# Patient Record
Sex: FEMALE | Race: WHITE | Employment: OTHER | URBAN - METROPOLITAN AREA
[De-identification: names, ages, dates, MRNs, and addresses within clinical notes are randomized per-mention and may not be internally consistent; named-entity substitution may affect disease eponyms.]

---

## 2017-04-20 ENCOUNTER — HOSPITAL ENCOUNTER (OUTPATIENT)
Dept: GENERAL RADIOLOGY | Age: 82
Discharge: HOME | End: 2017-04-20
Attending: FAMILY MEDICINE
Payer: MEDICARE

## 2017-04-20 DIAGNOSIS — R05.9 COUGH: ICD-10-CM

## 2017-04-20 PROCEDURE — 71020 XR CHEST PA LAT: CPT

## 2018-06-25 ENCOUNTER — HOSPITAL ENCOUNTER (OUTPATIENT)
Dept: GENERAL RADIOLOGY | Age: 83
Discharge: HOME | End: 2018-06-25
Attending: FAMILY MEDICINE
Payer: MEDICARE

## 2018-06-25 DIAGNOSIS — M79.602 LEFT ARM PAIN: ICD-10-CM

## 2018-06-25 PROCEDURE — 73060 X-RAY EXAM OF HUMERUS: CPT

## 2020-02-11 ENCOUNTER — HOSPITAL ENCOUNTER (OUTPATIENT)
Dept: GENERAL RADIOLOGY | Age: 85
Discharge: HOME | End: 2020-02-11
Attending: INTERNAL MEDICINE
Payer: MEDICARE

## 2020-02-11 DIAGNOSIS — R05.9 COUGH: ICD-10-CM

## 2020-02-11 PROCEDURE — 71046 X-RAY EXAM CHEST 2 VIEWS: CPT

## 2021-01-01 ENCOUNTER — APPOINTMENT (OUTPATIENT)
Dept: GENERAL RADIOLOGY | Age: 86
DRG: 311 | End: 2021-01-01
Attending: EMERGENCY MEDICINE
Payer: MEDICARE

## 2021-01-01 ENCOUNTER — HOSPITAL ENCOUNTER (INPATIENT)
Age: 86
LOS: 2 days | DRG: 951 | End: 2021-10-24
Attending: FAMILY MEDICINE | Admitting: FAMILY MEDICINE
Payer: OTHER MISCELLANEOUS

## 2021-01-01 ENCOUNTER — HOME CARE VISIT (OUTPATIENT)
Dept: HOSPICE | Facility: HOSPICE | Age: 86
End: 2021-01-01
Payer: MEDICARE

## 2021-01-01 ENCOUNTER — HOSPICE ADMISSION (OUTPATIENT)
Dept: HOSPICE | Facility: HOSPICE | Age: 86
End: 2021-01-01
Payer: MEDICARE

## 2021-01-01 ENCOUNTER — HOSPITAL ENCOUNTER (INPATIENT)
Age: 86
LOS: 2 days | Discharge: HOSPICE | DRG: 311 | End: 2021-10-22
Attending: STUDENT IN AN ORGANIZED HEALTH CARE EDUCATION/TRAINING PROGRAM | Admitting: INTERNAL MEDICINE
Payer: MEDICARE

## 2021-01-01 ENCOUNTER — APPOINTMENT (OUTPATIENT)
Dept: NON INVASIVE DIAGNOSTICS | Age: 86
DRG: 311 | End: 2021-01-01
Attending: INTERNAL MEDICINE
Payer: MEDICARE

## 2021-01-01 ENCOUNTER — TELEPHONE (OUTPATIENT)
Dept: CARDIOLOGY CLINIC | Age: 86
End: 2021-01-01

## 2021-01-01 VITALS
TEMPERATURE: 97.5 F | WEIGHT: 190.26 LBS | BODY MASS INDEX: 37.35 KG/M2 | DIASTOLIC BLOOD PRESSURE: 59 MMHG | HEIGHT: 60 IN | RESPIRATION RATE: 20 BRPM | SYSTOLIC BLOOD PRESSURE: 172 MMHG | HEART RATE: 72 BPM | OXYGEN SATURATION: 95 %

## 2021-01-01 VITALS
BODY MASS INDEX: 37.35 KG/M2 | RESPIRATION RATE: 22 BRPM | OXYGEN SATURATION: 90 % | HEART RATE: 100 BPM | WEIGHT: 190.26 LBS | TEMPERATURE: 98.2 F | SYSTOLIC BLOOD PRESSURE: 147 MMHG | HEIGHT: 60 IN | DIASTOLIC BLOOD PRESSURE: 69 MMHG

## 2021-01-01 DIAGNOSIS — Z71.1 CONCERN ABOUT END OF LIFE: ICD-10-CM

## 2021-01-01 DIAGNOSIS — I50.9 ACUTE ON CHRONIC CONGESTIVE HEART FAILURE, UNSPECIFIED HEART FAILURE TYPE (HCC): ICD-10-CM

## 2021-01-01 DIAGNOSIS — F41.9 ANXIETY: ICD-10-CM

## 2021-01-01 DIAGNOSIS — R06.4 LABORED BREATHING: ICD-10-CM

## 2021-01-01 DIAGNOSIS — J96.00 ACUTE RESPIRATORY FAILURE, UNSPECIFIED WHETHER WITH HYPOXIA OR HYPERCAPNIA (HCC): ICD-10-CM

## 2021-01-01 DIAGNOSIS — Z51.5 HOSPICE CARE: ICD-10-CM

## 2021-01-01 DIAGNOSIS — K11.7 INCREASED OROPHARYNGEAL SECRETIONS: ICD-10-CM

## 2021-01-01 DIAGNOSIS — R77.8 ELEVATED TROPONIN: ICD-10-CM

## 2021-01-01 DIAGNOSIS — I50.84 END STAGE HEART FAILURE (HCC): ICD-10-CM

## 2021-01-01 DIAGNOSIS — R09.02 HYPOXIA: Primary | ICD-10-CM

## 2021-01-01 DIAGNOSIS — I50.41 ACUTE COMBINED SYSTOLIC AND DIASTOLIC ACC/AHA STAGE C CONGESTIVE HEART FAILURE (HCC): ICD-10-CM

## 2021-01-01 DIAGNOSIS — Z71.89 ADVANCED CARE PLANNING/COUNSELING DISCUSSION: ICD-10-CM

## 2021-01-01 DIAGNOSIS — R45.1 RESTLESSNESS: ICD-10-CM

## 2021-01-01 DIAGNOSIS — Z71.89 GOALS OF CARE, COUNSELING/DISCUSSION: ICD-10-CM

## 2021-01-01 DIAGNOSIS — R06.02 SHORTNESS OF BREATH: ICD-10-CM

## 2021-01-01 DIAGNOSIS — Z51.5 PALLIATIVE CARE ENCOUNTER: ICD-10-CM

## 2021-01-01 DIAGNOSIS — R06.82 TACHYPNEA: ICD-10-CM

## 2021-01-01 LAB
ALBUMIN SERPL-MCNC: 2 G/DL (ref 3.5–5)
ALBUMIN/GLOB SERPL: 0.4 {RATIO} (ref 1.1–2.2)
ALP SERPL-CCNC: 67 U/L (ref 45–117)
ALT SERPL-CCNC: 33 U/L (ref 12–78)
ANION GAP SERPL CALC-SCNC: 10 MMOL/L (ref 5–15)
ANION GAP SERPL CALC-SCNC: 6 MMOL/L (ref 5–15)
APTT PPP: 121 SEC (ref 22.1–31)
APTT PPP: 24.4 SEC (ref 22.1–31)
APTT PPP: 49.2 SEC (ref 22.1–31)
APTT PPP: 50.1 SEC (ref 22.1–31)
APTT PPP: 64.7 SEC (ref 22.1–31)
APTT PPP: >130 SEC (ref 22.1–31)
AST SERPL-CCNC: 17 U/L (ref 15–37)
ATRIAL RATE: 99 BPM
BASOPHILS # BLD: 0 K/UL (ref 0–0.1)
BASOPHILS # BLD: 0 K/UL (ref 0–0.1)
BASOPHILS # BLD: 0.1 K/UL (ref 0–0.1)
BASOPHILS NFR BLD: 0 % (ref 0–1)
BASOPHILS NFR BLD: 0 % (ref 0–1)
BASOPHILS NFR BLD: 1 % (ref 0–1)
BILIRUB SERPL-MCNC: 0.4 MG/DL (ref 0.2–1)
BNP SERPL-MCNC: ABNORMAL PG/ML
BUN SERPL-MCNC: 32 MG/DL (ref 6–20)
BUN SERPL-MCNC: 32 MG/DL (ref 6–20)
BUN/CREAT SERPL: 13 (ref 12–20)
BUN/CREAT SERPL: 14 (ref 12–20)
CALCIUM SERPL-MCNC: 8.2 MG/DL (ref 8.5–10.1)
CALCIUM SERPL-MCNC: 8.2 MG/DL (ref 8.5–10.1)
CALCULATED P AXIS, ECG09: 38 DEGREES
CALCULATED R AXIS, ECG10: -16 DEGREES
CALCULATED T AXIS, ECG11: 159 DEGREES
CHLORIDE SERPL-SCNC: 106 MMOL/L (ref 97–108)
CHLORIDE SERPL-SCNC: 107 MMOL/L (ref 97–108)
CO2 SERPL-SCNC: 28 MMOL/L (ref 21–32)
CO2 SERPL-SCNC: 31 MMOL/L (ref 21–32)
COMMENT, HOLDF: NORMAL
COVID-19 RAPID TEST, COVR: NOT DETECTED
CREAT SERPL-MCNC: 2.37 MG/DL (ref 0.55–1.02)
CREAT SERPL-MCNC: 2.51 MG/DL (ref 0.55–1.02)
DIAGNOSIS, 93000: NORMAL
DIFFERENTIAL METHOD BLD: ABNORMAL
ECHO AO ASC DIAM: 2.89 CM
ECHO AO ROOT DIAM: 3.58 CM
ECHO AV AREA PEAK VELOCITY: 0.46 CM2
ECHO AV AREA VTI: 0.53 CM2
ECHO AV AREA/BSA PEAK VELOCITY: 0.3 CM2/M2
ECHO AV AREA/BSA VTI: 0.3 CM2/M2
ECHO AV MEAN GRADIENT: 32.1 MMHG
ECHO AV PEAK GRADIENT: 52.56 MMHG
ECHO AV PEAK VELOCITY: 362.48 CM/S
ECHO AV VTI: 56.64 CM
ECHO IVC PROX: 1.93 CM
ECHO LA AREA 4C: 16.24 CM2
ECHO LA MAJOR AXIS: 3.9 CM
ECHO LA MINOR AXIS: 2.13 CM
ECHO LA VOL 2C: 33.03 ML (ref 22–52)
ECHO LA VOL 4C: 39.51 ML (ref 22–52)
ECHO LA VOL BP: 41.48 ML (ref 22–52)
ECHO LA VOL/BSA BIPLANE: 22.67 ML/M2 (ref 16–28)
ECHO LA VOLUME INDEX A2C: 18.05 ML/M2 (ref 16–28)
ECHO LA VOLUME INDEX A4C: 21.59 ML/M2 (ref 16–28)
ECHO LV E' LATERAL VELOCITY: 6.03 CENTIMETER/SECOND
ECHO LV E' SEPTAL VELOCITY: 5.54 CENTIMETER/SECOND
ECHO LV INTERNAL DIMENSION DIASTOLIC: 3.43 CM (ref 3.9–5.3)
ECHO LV INTERNAL DIMENSION SYSTOLIC: 3.27 CM
ECHO LV IVSD: 1.04 CM (ref 0.6–0.9)
ECHO LV MASS 2D: 102.5 G (ref 67–162)
ECHO LV MASS INDEX 2D: 56 G/M2 (ref 43–95)
ECHO LV POSTERIOR WALL DIASTOLIC: 0.99 CM (ref 0.6–0.9)
ECHO LVOT DIAM: 1.86 CM
ECHO LVOT PEAK GRADIENT: 1.76 MMHG
ECHO LVOT PEAK VELOCITY: 61.18 CM/S
ECHO LVOT SV: 29.8 ML
ECHO LVOT VTI: 10.98 CM
ECHO MV A VELOCITY: 0.1 CENTIMETER/SECOND
ECHO MV AREA PHT: 7.72 CM2
ECHO MV E DECELERATION TIME (DT): 98.24 MS
ECHO MV E VELOCITY: 118.79 CENTIMETER/SECOND
ECHO MV PRESSURE HALF TIME (PHT): 28.49 MS
ECHO RV INTERNAL DIMENSION: 3.81 CM
ECHO RV TAPSE: 1.1 CM (ref 1.5–2)
ECHO TV REGURGITANT MAX VELOCITY: 296.39 CM/S
ECHO TV REGURGITANT PEAK GRADIENT: 35.14 MMHG
EOSINOPHIL # BLD: 0 K/UL (ref 0–0.4)
EOSINOPHIL # BLD: 0.2 K/UL (ref 0–0.4)
EOSINOPHIL # BLD: 0.4 K/UL (ref 0–0.4)
EOSINOPHIL NFR BLD: 0 % (ref 0–7)
EOSINOPHIL NFR BLD: 2 % (ref 0–7)
EOSINOPHIL NFR BLD: 3 % (ref 0–7)
ERYTHROCYTE [DISTWIDTH] IN BLOOD BY AUTOMATED COUNT: 17.2 % (ref 11.5–14.5)
GLOBULIN SER CALC-MCNC: 4.9 G/DL (ref 2–4)
GLUCOSE BLD STRIP.AUTO-MCNC: 116 MG/DL (ref 65–117)
GLUCOSE BLD STRIP.AUTO-MCNC: 125 MG/DL (ref 65–117)
GLUCOSE BLD STRIP.AUTO-MCNC: 155 MG/DL (ref 65–117)
GLUCOSE BLD STRIP.AUTO-MCNC: 164 MG/DL (ref 65–117)
GLUCOSE BLD STRIP.AUTO-MCNC: 183 MG/DL (ref 65–117)
GLUCOSE BLD STRIP.AUTO-MCNC: 253 MG/DL (ref 65–117)
GLUCOSE SERPL-MCNC: 117 MG/DL (ref 65–100)
GLUCOSE SERPL-MCNC: 159 MG/DL (ref 65–100)
HCT VFR BLD AUTO: 29.9 % (ref 35–47)
HCT VFR BLD AUTO: 31.6 % (ref 35–47)
HCT VFR BLD AUTO: 32.5 % (ref 35–47)
HGB BLD-MCNC: 10 G/DL (ref 11.5–16)
HGB BLD-MCNC: 9.1 G/DL (ref 11.5–16)
HGB BLD-MCNC: 9.9 G/DL (ref 11.5–16)
IMM GRANULOCYTES # BLD AUTO: 0 K/UL
IMM GRANULOCYTES NFR BLD AUTO: 0 %
LA VOL DISK BP: 37.77 ML (ref 22–52)
LVOT MG: 0.79 MMHG
LYMPHOCYTES # BLD: 1.5 K/UL (ref 0.8–3.5)
LYMPHOCYTES # BLD: 1.9 K/UL (ref 0.8–3.5)
LYMPHOCYTES # BLD: 2.3 K/UL (ref 0.8–3.5)
LYMPHOCYTES NFR BLD: 13 % (ref 12–49)
LYMPHOCYTES NFR BLD: 18 % (ref 12–49)
LYMPHOCYTES NFR BLD: 20 % (ref 12–49)
MAGNESIUM SERPL-MCNC: 2.1 MG/DL (ref 1.6–2.4)
MCH RBC QN AUTO: 28.4 PG (ref 26–34)
MCH RBC QN AUTO: 28.7 PG (ref 26–34)
MCH RBC QN AUTO: 29.4 PG (ref 26–34)
MCHC RBC AUTO-ENTMCNC: 30.4 G/DL (ref 30–36.5)
MCHC RBC AUTO-ENTMCNC: 30.8 G/DL (ref 30–36.5)
MCHC RBC AUTO-ENTMCNC: 31.3 G/DL (ref 30–36.5)
MCV RBC AUTO: 93.4 FL (ref 80–99)
MCV RBC AUTO: 93.4 FL (ref 80–99)
MCV RBC AUTO: 93.8 FL (ref 80–99)
METAMYELOCYTES NFR BLD MANUAL: 1 %
METAMYELOCYTES NFR BLD MANUAL: 1 %
MONOCYTES # BLD: 0.3 K/UL (ref 0–1)
MONOCYTES # BLD: 0.4 K/UL (ref 0–1)
MONOCYTES # BLD: 1.1 K/UL (ref 0–1)
MONOCYTES NFR BLD: 2 % (ref 5–13)
MONOCYTES NFR BLD: 4 % (ref 5–13)
MONOCYTES NFR BLD: 9 % (ref 5–13)
MYELOCYTES NFR BLD MANUAL: 1 %
MYELOCYTES NFR BLD MANUAL: 2 %
NEUTS BAND NFR BLD MANUAL: 4 % (ref 0–6)
NEUTS SEG # BLD: 6.8 K/UL (ref 1.8–8)
NEUTS SEG # BLD: 9.3 K/UL (ref 1.8–8)
NEUTS SEG # BLD: 9.5 K/UL (ref 1.8–8)
NEUTS SEG NFR BLD: 70 % (ref 32–75)
NEUTS SEG NFR BLD: 71 % (ref 32–75)
NEUTS SEG NFR BLD: 78 % (ref 32–75)
NRBC # BLD: 0 K/UL (ref 0–0.01)
NRBC BLD-RTO: 0 PER 100 WBC
P-R INTERVAL, ECG05: 156 MS
PLATELET # BLD AUTO: 359 K/UL (ref 150–400)
PLATELET # BLD AUTO: 361 K/UL (ref 150–400)
PLATELET # BLD AUTO: 375 K/UL (ref 150–400)
PMV BLD AUTO: 9.7 FL (ref 8.9–12.9)
PMV BLD AUTO: 9.9 FL (ref 8.9–12.9)
PMV BLD AUTO: 9.9 FL (ref 8.9–12.9)
POTASSIUM SERPL-SCNC: 3.2 MMOL/L (ref 3.5–5.1)
POTASSIUM SERPL-SCNC: 3.5 MMOL/L (ref 3.5–5.1)
PROT SERPL-MCNC: 6.9 G/DL (ref 6.4–8.2)
Q-T INTERVAL, ECG07: 342 MS
QRS DURATION, ECG06: 88 MS
QTC CALCULATION (BEZET), ECG08: 438 MS
RBC # BLD AUTO: 3.2 M/UL (ref 3.8–5.2)
RBC # BLD AUTO: 3.37 M/UL (ref 3.8–5.2)
RBC # BLD AUTO: 3.48 M/UL (ref 3.8–5.2)
RBC MORPH BLD: ABNORMAL
SAMPLES BEING HELD,HOLD: NORMAL
SERVICE CMNT-IMP: ABNORMAL
SERVICE CMNT-IMP: NORMAL
SODIUM SERPL-SCNC: 143 MMOL/L (ref 136–145)
SODIUM SERPL-SCNC: 145 MMOL/L (ref 136–145)
SOURCE, COVRS: NORMAL
THERAPEUTIC RANGE,PTTT: ABNORMAL SECS (ref 58–77)
THERAPEUTIC RANGE,PTTT: NORMAL SECS (ref 58–77)
TROPONIN-HIGH SENSITIVITY: 806 NG/L (ref 0–51)
TROPONIN-HIGH SENSITIVITY: 851 NG/L (ref 0–51)
TROPONIN-HIGH SENSITIVITY: 916 NG/L (ref 0–51)
VENTRICULAR RATE, ECG03: 99 BPM
WBC # BLD AUTO: 11.9 K/UL (ref 3.6–11)
WBC # BLD AUTO: 12.6 K/UL (ref 3.6–11)
WBC # BLD AUTO: 9.7 K/UL (ref 3.6–11)
WBC MORPH BLD: ABNORMAL

## 2021-01-01 PROCEDURE — 77010033678 HC OXYGEN DAILY

## 2021-01-01 PROCEDURE — 0656 HSPC GENERAL INPATIENT

## 2021-01-01 PROCEDURE — 3336500001 HSPC ELECTION

## 2021-01-01 PROCEDURE — 74011000258 HC RX REV CODE- 258: Performed by: INTERNAL MEDICINE

## 2021-01-01 PROCEDURE — 80053 COMPREHEN METABOLIC PANEL: CPT

## 2021-01-01 PROCEDURE — 74011250636 HC RX REV CODE- 250/636: Performed by: INTERNAL MEDICINE

## 2021-01-01 PROCEDURE — 74011250636 HC RX REV CODE- 250/636: Performed by: HOSPITALIST

## 2021-01-01 PROCEDURE — 65270000029 HC RM PRIVATE

## 2021-01-01 PROCEDURE — 74011250636 HC RX REV CODE- 250/636: Performed by: FAMILY MEDICINE

## 2021-01-01 PROCEDURE — 84484 ASSAY OF TROPONIN QUANT: CPT

## 2021-01-01 PROCEDURE — 94640 AIRWAY INHALATION TREATMENT: CPT

## 2021-01-01 PROCEDURE — 99221 1ST HOSP IP/OBS SF/LOW 40: CPT | Performed by: NURSE PRACTITIONER

## 2021-01-01 PROCEDURE — 2709999900 HC NON-CHARGEABLE SUPPLY

## 2021-01-01 PROCEDURE — 36415 COLL VENOUS BLD VENIPUNCTURE: CPT

## 2021-01-01 PROCEDURE — 94664 DEMO&/EVAL PT USE INHALER: CPT

## 2021-01-01 PROCEDURE — 65660000000 HC RM CCU STEPDOWN

## 2021-01-01 PROCEDURE — 99233 SBSQ HOSP IP/OBS HIGH 50: CPT | Performed by: INTERNAL MEDICINE

## 2021-01-01 PROCEDURE — 82962 GLUCOSE BLOOD TEST: CPT

## 2021-01-01 PROCEDURE — 85730 THROMBOPLASTIN TIME PARTIAL: CPT

## 2021-01-01 PROCEDURE — 80048 BASIC METABOLIC PNL TOTAL CA: CPT

## 2021-01-01 PROCEDURE — 93005 ELECTROCARDIOGRAM TRACING: CPT

## 2021-01-01 PROCEDURE — 74011250637 HC RX REV CODE- 250/637: Performed by: SPECIALIST

## 2021-01-01 PROCEDURE — 83880 ASSAY OF NATRIURETIC PEPTIDE: CPT

## 2021-01-01 PROCEDURE — 74011000250 HC RX REV CODE- 250: Performed by: INTERNAL MEDICINE

## 2021-01-01 PROCEDURE — 71045 X-RAY EXAM CHEST 1 VIEW: CPT

## 2021-01-01 PROCEDURE — C8929 TTE W OR WO FOL WCON,DOPPLER: HCPCS

## 2021-01-01 PROCEDURE — 99231 SBSQ HOSP IP/OBS SF/LOW 25: CPT | Performed by: SPECIALIST

## 2021-01-01 PROCEDURE — 93306 TTE W/DOPPLER COMPLETE: CPT | Performed by: INTERNAL MEDICINE

## 2021-01-01 PROCEDURE — 99285 EMERGENCY DEPT VISIT HI MDM: CPT

## 2021-01-01 PROCEDURE — 92610 EVALUATE SWALLOWING FUNCTION: CPT

## 2021-01-01 PROCEDURE — 74011250637 HC RX REV CODE- 250/637: Performed by: INTERNAL MEDICINE

## 2021-01-01 PROCEDURE — 74011636637 HC RX REV CODE- 636/637: Performed by: INTERNAL MEDICINE

## 2021-01-01 PROCEDURE — 74011250637 HC RX REV CODE- 250/637: Performed by: HOSPITALIST

## 2021-01-01 PROCEDURE — 74011000250 HC RX REV CODE- 250: Performed by: FAMILY MEDICINE

## 2021-01-01 PROCEDURE — 83735 ASSAY OF MAGNESIUM: CPT

## 2021-01-01 PROCEDURE — 74011000250 HC RX REV CODE- 250: Performed by: HOSPITALIST

## 2021-01-01 PROCEDURE — 87635 SARS-COV-2 COVID-19 AMP PRB: CPT

## 2021-01-01 PROCEDURE — 85025 COMPLETE CBC W/AUTO DIFF WBC: CPT

## 2021-01-01 PROCEDURE — 99223 1ST HOSP IP/OBS HIGH 75: CPT | Performed by: INTERNAL MEDICINE

## 2021-01-01 PROCEDURE — 77030013140 HC MSK NEB VYRM -A

## 2021-01-01 RX ORDER — FACIAL-BODY WIPES
10 EACH TOPICAL DAILY PRN
Status: DISCONTINUED | OUTPATIENT
Start: 2021-01-01 | End: 2021-01-01 | Stop reason: HOSPADM

## 2021-01-01 RX ORDER — ASPIRIN 81 MG/1
81 TABLET ORAL DAILY
COMMUNITY
End: 2021-01-01

## 2021-01-01 RX ORDER — FLUTICASONE PROPIONATE 220 UG/1
2 AEROSOL, METERED RESPIRATORY (INHALATION)
COMMUNITY
End: 2021-01-01

## 2021-01-01 RX ORDER — PANTOPRAZOLE SODIUM 40 MG/1
40 TABLET, DELAYED RELEASE ORAL DAILY
COMMUNITY
End: 2021-01-01

## 2021-01-01 RX ORDER — MAGNESIUM SULFATE 100 %
4 CRYSTALS MISCELLANEOUS AS NEEDED
Status: DISCONTINUED | OUTPATIENT
Start: 2021-01-01 | End: 2021-01-01 | Stop reason: HOSPADM

## 2021-01-01 RX ORDER — BUDESONIDE 0.5 MG/2ML
500 INHALANT ORAL
Status: DISCONTINUED | OUTPATIENT
Start: 2021-01-01 | End: 2021-01-01 | Stop reason: HOSPADM

## 2021-01-01 RX ORDER — GUAIFENESIN 100 MG/5ML
81 LIQUID (ML) ORAL DAILY
Status: DISCONTINUED | OUTPATIENT
Start: 2021-01-01 | End: 2021-01-01 | Stop reason: HOSPADM

## 2021-01-01 RX ORDER — HEPARIN SODIUM 10000 [USP'U]/100ML
12-25 INJECTION, SOLUTION INTRAVENOUS
Status: DISCONTINUED | OUTPATIENT
Start: 2021-01-01 | End: 2021-01-01

## 2021-01-01 RX ORDER — ATORVASTATIN CALCIUM 20 MG/1
20 TABLET, FILM COATED ORAL
Status: DISCONTINUED | OUTPATIENT
Start: 2021-01-01 | End: 2021-01-01 | Stop reason: HOSPADM

## 2021-01-01 RX ORDER — HEPARIN SODIUM 1000 [USP'U]/ML
4000 INJECTION, SOLUTION INTRAVENOUS; SUBCUTANEOUS ONCE
Status: COMPLETED | OUTPATIENT
Start: 2021-01-01 | End: 2021-01-01

## 2021-01-01 RX ORDER — BUMETANIDE 0.25 MG/ML
1 INJECTION INTRAMUSCULAR; INTRAVENOUS 2 TIMES DAILY
Status: DISCONTINUED | OUTPATIENT
Start: 2021-01-01 | End: 2021-01-01 | Stop reason: HOSPADM

## 2021-01-01 RX ORDER — BUDESONIDE 0.5 MG/2ML
500 INHALANT ORAL
COMMUNITY
End: 2021-01-01

## 2021-01-01 RX ORDER — GLYCOPYRROLATE 0.2 MG/ML
0.2 INJECTION INTRAMUSCULAR; INTRAVENOUS
Status: DISCONTINUED | OUTPATIENT
Start: 2021-01-01 | End: 2021-01-01 | Stop reason: HOSPADM

## 2021-01-01 RX ORDER — CALCIUM CARB/MAGNESIUM CARB 311-232MG
5 TABLET ORAL
Status: DISCONTINUED | OUTPATIENT
Start: 2021-01-01 | End: 2021-01-01 | Stop reason: HOSPADM

## 2021-01-01 RX ORDER — ACETAMINOPHEN 325 MG/1
650 TABLET ORAL
Status: DISCONTINUED | OUTPATIENT
Start: 2021-01-01 | End: 2021-01-01 | Stop reason: HOSPADM

## 2021-01-01 RX ORDER — POLYETHYLENE GLYCOL 3350 17 G/17G
17 POWDER, FOR SOLUTION ORAL DAILY PRN
Status: DISCONTINUED | OUTPATIENT
Start: 2021-01-01 | End: 2021-01-01 | Stop reason: HOSPADM

## 2021-01-01 RX ORDER — SODIUM CHLORIDE 0.9 % (FLUSH) 0.9 %
5 SYRINGE (ML) INJECTION AS NEEDED
Status: DISCONTINUED | OUTPATIENT
Start: 2021-01-01 | End: 2021-01-01 | Stop reason: HOSPADM

## 2021-01-01 RX ORDER — ONDANSETRON 2 MG/ML
4 INJECTION INTRAMUSCULAR; INTRAVENOUS
Status: DISCONTINUED | OUTPATIENT
Start: 2021-01-01 | End: 2021-01-01 | Stop reason: HOSPADM

## 2021-01-01 RX ORDER — AMLODIPINE BESYLATE 5 MG/1
5 TABLET ORAL DAILY
Status: DISCONTINUED | OUTPATIENT
Start: 2021-01-01 | End: 2021-01-01 | Stop reason: HOSPADM

## 2021-01-01 RX ORDER — INSULIN LISPRO 100 [IU]/ML
INJECTION, SOLUTION INTRAVENOUS; SUBCUTANEOUS EVERY 6 HOURS
Status: DISCONTINUED | OUTPATIENT
Start: 2021-01-01 | End: 2021-01-01 | Stop reason: HOSPADM

## 2021-01-01 RX ORDER — FUROSEMIDE 40 MG/1
40 TABLET ORAL DAILY
COMMUNITY

## 2021-01-01 RX ORDER — HALOPERIDOL 5 MG/ML
2 INJECTION INTRAMUSCULAR
Status: DISCONTINUED | OUTPATIENT
Start: 2021-01-01 | End: 2021-01-01

## 2021-01-01 RX ORDER — QUETIAPINE FUMARATE 25 MG/1
25 TABLET, FILM COATED ORAL ONCE
Status: ACTIVE | OUTPATIENT
Start: 2021-01-01 | End: 2021-01-01

## 2021-01-01 RX ORDER — METOPROLOL TARTRATE 5 MG/5ML
5 INJECTION INTRAVENOUS ONCE
Status: COMPLETED | OUTPATIENT
Start: 2021-01-01 | End: 2021-01-01

## 2021-01-01 RX ORDER — DIPHENHYDRAMINE CITRATE AND IBUPROFEN 38; 200 MG/1; MG/1
1 TABLET, COATED ORAL
COMMUNITY
End: 2021-01-01

## 2021-01-01 RX ORDER — LORAZEPAM 2 MG/ML
1 INJECTION INTRAMUSCULAR
Status: DISCONTINUED | OUTPATIENT
Start: 2021-01-01 | End: 2021-01-01 | Stop reason: HOSPADM

## 2021-01-01 RX ORDER — KETOROLAC TROMETHAMINE 30 MG/ML
15 INJECTION, SOLUTION INTRAMUSCULAR; INTRAVENOUS
Status: DISCONTINUED | OUTPATIENT
Start: 2021-01-01 | End: 2021-01-01 | Stop reason: HOSPADM

## 2021-01-01 RX ORDER — HYDROMORPHONE HYDROCHLORIDE 1 MG/ML
1 INJECTION, SOLUTION INTRAMUSCULAR; INTRAVENOUS; SUBCUTANEOUS
Status: DISCONTINUED | OUTPATIENT
Start: 2021-01-01 | End: 2021-01-01 | Stop reason: HOSPADM

## 2021-01-01 RX ORDER — LATANOPROST 50 UG/ML
1 SOLUTION/ DROPS OPHTHALMIC
COMMUNITY
End: 2021-01-01

## 2021-01-01 RX ORDER — CARVEDILOL 6.25 MG/1
6.25 TABLET ORAL 2 TIMES DAILY WITH MEALS
COMMUNITY
End: 2021-01-01

## 2021-01-01 RX ORDER — SODIUM CHLORIDE 0.9 % (FLUSH) 0.9 %
5-40 SYRINGE (ML) INJECTION AS NEEDED
Status: DISCONTINUED | OUTPATIENT
Start: 2021-01-01 | End: 2021-01-01 | Stop reason: HOSPADM

## 2021-01-01 RX ORDER — HYDROMORPHONE HYDROCHLORIDE 1 MG/ML
0.5 INJECTION, SOLUTION INTRAMUSCULAR; INTRAVENOUS; SUBCUTANEOUS EVERY 4 HOURS
Status: DISCONTINUED | OUTPATIENT
Start: 2021-01-01 | End: 2021-01-01

## 2021-01-01 RX ORDER — AMIODARONE HYDROCHLORIDE 200 MG/1
200 TABLET ORAL 2 TIMES DAILY
Status: DISCONTINUED | OUTPATIENT
Start: 2021-01-01 | End: 2021-01-01 | Stop reason: HOSPADM

## 2021-01-01 RX ORDER — GUAIFENESIN/DEXTROMETHORPHAN 100-10MG/5
10 SYRUP ORAL
Status: DISCONTINUED | OUTPATIENT
Start: 2021-01-01 | End: 2021-01-01 | Stop reason: HOSPADM

## 2021-01-01 RX ORDER — HYDRALAZINE HYDROCHLORIDE 20 MG/ML
10 INJECTION INTRAMUSCULAR; INTRAVENOUS
Status: DISCONTINUED | OUTPATIENT
Start: 2021-01-01 | End: 2021-01-01 | Stop reason: HOSPADM

## 2021-01-01 RX ORDER — HALOPERIDOL 5 MG/ML
5 INJECTION INTRAMUSCULAR ONCE
Status: COMPLETED | OUTPATIENT
Start: 2021-01-01 | End: 2021-01-01

## 2021-01-01 RX ORDER — HEPARIN SODIUM 5000 [USP'U]/ML
5000 INJECTION, SOLUTION INTRAVENOUS; SUBCUTANEOUS EVERY 8 HOURS
Status: DISCONTINUED | OUTPATIENT
Start: 2021-01-01 | End: 2021-01-01 | Stop reason: SDUPTHER

## 2021-01-01 RX ORDER — LEVOTHYROXINE SODIUM 50 UG/1
75 TABLET ORAL
Status: DISCONTINUED | OUTPATIENT
Start: 2021-01-01 | End: 2021-01-01 | Stop reason: HOSPADM

## 2021-01-01 RX ORDER — ALBUTEROL SULFATE 0.83 MG/ML
2.5 SOLUTION RESPIRATORY (INHALATION)
COMMUNITY

## 2021-01-01 RX ORDER — HALOPERIDOL 5 MG/ML
2 INJECTION INTRAMUSCULAR
Status: COMPLETED | OUTPATIENT
Start: 2021-01-01 | End: 2021-01-01

## 2021-01-01 RX ORDER — ACETAMINOPHEN 650 MG/1
650 SUPPOSITORY RECTAL
Status: DISCONTINUED | OUTPATIENT
Start: 2021-01-01 | End: 2021-01-01 | Stop reason: HOSPADM

## 2021-01-01 RX ORDER — SODIUM CHLORIDE 0.9 % (FLUSH) 0.9 %
5-40 SYRINGE (ML) INJECTION EVERY 8 HOURS
Status: DISCONTINUED | OUTPATIENT
Start: 2021-01-01 | End: 2021-01-01 | Stop reason: HOSPADM

## 2021-01-01 RX ORDER — HYDROMORPHONE HYDROCHLORIDE 1 MG/ML
0.5 INJECTION, SOLUTION INTRAMUSCULAR; INTRAVENOUS; SUBCUTANEOUS
Status: DISCONTINUED | OUTPATIENT
Start: 2021-01-01 | End: 2021-01-01

## 2021-01-01 RX ORDER — HALOPERIDOL 5 MG/ML
2 INJECTION INTRAMUSCULAR EVERY 4 HOURS
Status: DISCONTINUED | OUTPATIENT
Start: 2021-01-01 | End: 2021-01-01

## 2021-01-01 RX ORDER — LEVOTHYROXINE SODIUM 75 UG/1
75 TABLET ORAL
COMMUNITY
End: 2021-01-01

## 2021-01-01 RX ORDER — HEPARIN SODIUM 1000 [USP'U]/ML
2000 INJECTION, SOLUTION INTRAVENOUS; SUBCUTANEOUS ONCE
Status: COMPLETED | OUTPATIENT
Start: 2021-01-01 | End: 2021-01-01

## 2021-01-01 RX ORDER — ONDANSETRON 4 MG/1
4 TABLET, ORALLY DISINTEGRATING ORAL
Status: DISCONTINUED | OUTPATIENT
Start: 2021-01-01 | End: 2021-01-01 | Stop reason: HOSPADM

## 2021-01-01 RX ORDER — GLIMEPIRIDE 2 MG/1
4 TABLET ORAL DAILY
COMMUNITY
End: 2021-01-01

## 2021-01-01 RX ORDER — KETOROLAC TROMETHAMINE 30 MG/ML
15 INJECTION, SOLUTION INTRAMUSCULAR; INTRAVENOUS
Status: DISCONTINUED | OUTPATIENT
Start: 2021-01-01 | End: 2021-01-01

## 2021-01-01 RX ORDER — POTASSIUM CHLORIDE 750 MG/1
40 TABLET, FILM COATED, EXTENDED RELEASE ORAL ONCE
Status: COMPLETED | OUTPATIENT
Start: 2021-01-01 | End: 2021-01-01

## 2021-01-01 RX ORDER — IPRATROPIUM BROMIDE AND ALBUTEROL SULFATE 2.5; .5 MG/3ML; MG/3ML
3 SOLUTION RESPIRATORY (INHALATION)
Status: DISCONTINUED | OUTPATIENT
Start: 2021-01-01 | End: 2021-01-01 | Stop reason: HOSPADM

## 2021-01-01 RX ORDER — HEPARIN SODIUM 1000 [USP'U]/ML
60 INJECTION, SOLUTION INTRAVENOUS; SUBCUTANEOUS ONCE
Status: DISCONTINUED | OUTPATIENT
Start: 2021-01-01 | End: 2021-01-01

## 2021-01-01 RX ORDER — CHOLECALCIFEROL (VITAMIN D3) 125 MCG
5 CAPSULE ORAL
COMMUNITY

## 2021-01-01 RX ORDER — METOPROLOL TARTRATE 25 MG/1
12.5 TABLET, FILM COATED ORAL 2 TIMES DAILY
Status: DISCONTINUED | OUTPATIENT
Start: 2021-01-01 | End: 2021-01-01

## 2021-01-01 RX ORDER — DEXTROSE 50 % IN WATER (D50W) INTRAVENOUS SYRINGE
12.5-25 AS NEEDED
Status: DISCONTINUED | OUTPATIENT
Start: 2021-01-01 | End: 2021-01-01 | Stop reason: HOSPADM

## 2021-01-01 RX ADMIN — LEVOTHYROXINE SODIUM 75 MCG: 0.05 TABLET ORAL at 08:17

## 2021-01-01 RX ADMIN — HEPARIN SODIUM 15 UNITS/KG/HR: 10000 INJECTION, SOLUTION INTRAVENOUS at 00:26

## 2021-01-01 RX ADMIN — BUMETANIDE 1 MG: 0.25 INJECTION INTRAMUSCULAR; INTRAVENOUS at 08:46

## 2021-01-01 RX ADMIN — HALOPERIDOL LACTATE 2 MG: 5 INJECTION, SOLUTION INTRAMUSCULAR at 11:38

## 2021-01-01 RX ADMIN — ACETAMINOPHEN 650 MG: 325 TABLET ORAL at 10:59

## 2021-01-01 RX ADMIN — HALOPERIDOL LACTATE 5 MG: 5 INJECTION, SOLUTION INTRAMUSCULAR at 20:56

## 2021-01-01 RX ADMIN — SODIUM CHLORIDE 2.5 MG/HR: 900 INJECTION, SOLUTION INTRAVENOUS at 12:27

## 2021-01-01 RX ADMIN — HYDRALAZINE HYDROCHLORIDE 10 MG: 20 INJECTION INTRAMUSCULAR; INTRAVENOUS at 15:34

## 2021-01-01 RX ADMIN — LORAZEPAM 1 MG: 2 INJECTION INTRAMUSCULAR; INTRAVENOUS at 18:46

## 2021-01-01 RX ADMIN — AMIODARONE HYDROCHLORIDE 1 MG/MIN: 50 INJECTION, SOLUTION INTRAVENOUS at 17:39

## 2021-01-01 RX ADMIN — METOPROLOL TARTRATE 12.5 MG: 25 TABLET, FILM COATED ORAL at 13:28

## 2021-01-01 RX ADMIN — ACETAMINOPHEN 650 MG: 325 TABLET ORAL at 20:56

## 2021-01-01 RX ADMIN — HALOPERIDOL LACTATE 2 MG: 5 INJECTION, SOLUTION INTRAMUSCULAR at 20:30

## 2021-01-01 RX ADMIN — HALOPERIDOL LACTATE 2 MG: 5 INJECTION, SOLUTION INTRAMUSCULAR at 12:53

## 2021-01-01 RX ADMIN — HYDRALAZINE HYDROCHLORIDE 10 MG: 20 INJECTION INTRAMUSCULAR; INTRAVENOUS at 11:00

## 2021-01-01 RX ADMIN — BUMETANIDE 1 MG: 0.25 INJECTION INTRAMUSCULAR; INTRAVENOUS at 18:15

## 2021-01-01 RX ADMIN — LEVOTHYROXINE SODIUM 75 MCG: 0.05 TABLET ORAL at 16:40

## 2021-01-01 RX ADMIN — AMIODARONE HYDROCHLORIDE 200 MG: 200 TABLET ORAL at 11:00

## 2021-01-01 RX ADMIN — AMLODIPINE BESYLATE 5 MG: 5 TABLET ORAL at 14:37

## 2021-01-01 RX ADMIN — Medication 10 ML: at 05:29

## 2021-01-01 RX ADMIN — HALOPERIDOL LACTATE 2 MG: 5 INJECTION, SOLUTION INTRAMUSCULAR at 23:33

## 2021-01-01 RX ADMIN — HALOPERIDOL LACTATE 2 MG: 5 INJECTION, SOLUTION INTRAMUSCULAR at 03:00

## 2021-01-01 RX ADMIN — GLYCOPYRROLATE 0.2 MG: 0.2 INJECTION, SOLUTION INTRAMUSCULAR; INTRAVENOUS at 21:47

## 2021-01-01 RX ADMIN — ATORVASTATIN CALCIUM 20 MG: 20 TABLET, FILM COATED ORAL at 21:03

## 2021-01-01 RX ADMIN — INSULIN LISPRO 2 UNITS: 100 INJECTION, SOLUTION INTRAVENOUS; SUBCUTANEOUS at 12:00

## 2021-01-01 RX ADMIN — PERFLUTREN 2 ML: 6.52 INJECTION, SUSPENSION INTRAVENOUS at 11:57

## 2021-01-01 RX ADMIN — GLYCOPYRROLATE 0.2 MG: 0.2 INJECTION, SOLUTION INTRAMUSCULAR; INTRAVENOUS at 01:11

## 2021-01-01 RX ADMIN — BUDESONIDE 500 MCG: 0.5 SUSPENSION RESPIRATORY (INHALATION) at 07:32

## 2021-01-01 RX ADMIN — HEPARIN SODIUM 12 UNITS/KG/HR: 10000 INJECTION, SOLUTION INTRAVENOUS at 00:19

## 2021-01-01 RX ADMIN — HYDROMORPHONE HYDROCHLORIDE 0.5 MG: 1 INJECTION, SOLUTION INTRAMUSCULAR; INTRAVENOUS; SUBCUTANEOUS at 13:57

## 2021-01-01 RX ADMIN — INSULIN LISPRO 5 UNITS: 100 INJECTION, SOLUTION INTRAVENOUS; SUBCUTANEOUS at 18:18

## 2021-01-01 RX ADMIN — HYDROMORPHONE HYDROCHLORIDE 1 MG: 1 INJECTION, SOLUTION INTRAMUSCULAR; INTRAVENOUS; SUBCUTANEOUS at 01:11

## 2021-01-01 RX ADMIN — Medication 5 MG: at 21:03

## 2021-01-01 RX ADMIN — LORAZEPAM 1 MG: 2 INJECTION INTRAMUSCULAR; INTRAVENOUS at 21:47

## 2021-01-01 RX ADMIN — ACETAMINOPHEN 650 MG: 325 TABLET ORAL at 05:23

## 2021-01-01 RX ADMIN — HYDROMORPHONE HYDROCHLORIDE 1 MG: 1 INJECTION, SOLUTION INTRAMUSCULAR; INTRAVENOUS; SUBCUTANEOUS at 21:47

## 2021-01-01 RX ADMIN — METOPROLOL TARTRATE 5 MG: 5 INJECTION INTRAVENOUS at 15:11

## 2021-01-01 RX ADMIN — HEPARIN SODIUM 2000 UNITS: 1000 INJECTION INTRAVENOUS; SUBCUTANEOUS at 16:38

## 2021-01-01 RX ADMIN — HYDROMORPHONE HYDROCHLORIDE 0.5 MG: 1 INJECTION, SOLUTION INTRAMUSCULAR; INTRAVENOUS; SUBCUTANEOUS at 20:36

## 2021-01-01 RX ADMIN — INSULIN LISPRO 2 UNITS: 100 INJECTION, SOLUTION INTRAVENOUS; SUBCUTANEOUS at 23:08

## 2021-01-01 RX ADMIN — SODIUM CHLORIDE 15 MG/HR: 900 INJECTION, SOLUTION INTRAVENOUS at 20:56

## 2021-01-01 RX ADMIN — HYDROMORPHONE HYDROCHLORIDE 0.5 MG: 1 INJECTION, SOLUTION INTRAMUSCULAR; INTRAVENOUS; SUBCUTANEOUS at 03:00

## 2021-01-01 RX ADMIN — Medication 10 ML: at 21:03

## 2021-01-01 RX ADMIN — GLYCOPYRROLATE 0.2 MG: 0.2 INJECTION, SOLUTION INTRAMUSCULAR; INTRAVENOUS at 19:00

## 2021-01-01 RX ADMIN — Medication 10 ML: at 06:25

## 2021-01-01 RX ADMIN — POTASSIUM CHLORIDE 40 MEQ: 750 TABLET, FILM COATED, EXTENDED RELEASE ORAL at 11:42

## 2021-01-01 RX ADMIN — HYDROMORPHONE HYDROCHLORIDE 0.5 MG: 1 INJECTION, SOLUTION INTRAMUSCULAR; INTRAVENOUS; SUBCUTANEOUS at 00:55

## 2021-01-01 RX ADMIN — LORAZEPAM 1 MG: 2 INJECTION INTRAMUSCULAR; INTRAVENOUS at 01:11

## 2021-01-01 RX ADMIN — GLYCOPYRROLATE 0.2 MG: 0.2 INJECTION, SOLUTION INTRAMUSCULAR; INTRAVENOUS at 16:01

## 2021-01-01 RX ADMIN — INSULIN LISPRO 2 UNITS: 100 INJECTION, SOLUTION INTRAVENOUS; SUBCUTANEOUS at 00:00

## 2021-01-01 RX ADMIN — HYDROMORPHONE HYDROCHLORIDE 1 MG: 1 INJECTION, SOLUTION INTRAMUSCULAR; INTRAVENOUS; SUBCUTANEOUS at 16:04

## 2021-01-01 RX ADMIN — ASPIRIN 81 MG: 81 TABLET, CHEWABLE ORAL at 08:17

## 2021-01-01 RX ADMIN — METOPROLOL TARTRATE 12.5 MG: 25 TABLET, FILM COATED ORAL at 18:21

## 2021-01-01 RX ADMIN — HALOPERIDOL 2 MG: 5 INJECTION INTRAMUSCULAR at 13:57

## 2021-01-01 RX ADMIN — ASPIRIN 81 MG: 81 TABLET, CHEWABLE ORAL at 13:28

## 2021-01-01 RX ADMIN — BUMETANIDE 1 MG: 0.25 INJECTION INTRAMUSCULAR; INTRAVENOUS at 08:18

## 2021-01-01 RX ADMIN — AMIODARONE HYDROCHLORIDE 0.5 MG/MIN: 50 INJECTION, SOLUTION INTRAVENOUS at 03:20

## 2021-01-01 RX ADMIN — HEPARIN SODIUM 4000 UNITS: 1000 INJECTION INTRAVENOUS; SUBCUTANEOUS at 00:18

## 2021-01-01 RX ADMIN — BUMETANIDE 1 MG: 0.25 INJECTION INTRAMUSCULAR; INTRAVENOUS at 00:00

## 2021-01-01 RX ADMIN — Medication 10 ML: at 14:38

## 2021-01-01 RX ADMIN — DEXTROSE MONOHYDRATE 150 MG: 50 INJECTION, SOLUTION INTRAVENOUS at 17:13

## 2021-01-01 RX ADMIN — HALOPERIDOL LACTATE 2 MG: 5 INJECTION, SOLUTION INTRAMUSCULAR at 08:55

## 2021-01-01 RX ADMIN — HYDROMORPHONE HYDROCHLORIDE 1 MG: 1 INJECTION, SOLUTION INTRAMUSCULAR; INTRAVENOUS; SUBCUTANEOUS at 18:49

## 2021-01-01 RX ADMIN — LORAZEPAM 1 MG: 2 INJECTION INTRAMUSCULAR; INTRAVENOUS at 15:56

## 2021-10-20 PROBLEM — I21.4 NSTEMI (NON-ST ELEVATED MYOCARDIAL INFARCTION) (HCC): Status: ACTIVE | Noted: 2021-01-01

## 2021-10-20 PROBLEM — F03.90 DEMENTIA (HCC): Status: ACTIVE | Noted: 2021-01-01

## 2021-10-20 PROBLEM — R79.89 ELEVATED BRAIN NATRIURETIC PEPTIDE (BNP) LEVEL: Status: ACTIVE | Noted: 2021-01-01

## 2021-10-20 PROBLEM — R77.8 ELEVATED TROPONIN: Status: ACTIVE | Noted: 2021-01-01

## 2021-10-21 NOTE — PROGRESS NOTES
Orders received and chart reviewed. Patient came to ED 10/20 with SOB and hypoxia. Has diagnosis of NSTEMI and went into A-fib in ER. Now on cardizem gtt. HR in 150's. Will defer today and check on patient tomorrow. Nursing aware.

## 2021-10-21 NOTE — CARDIO/PULMONARY
[facility] Cardiopulmonary Rehab: 81 yo female admitted with Chest Pain (10/20). Per Dr Susi Montalvo, dx includes: NSTEMI, Acute CHF & Acute on chronic renal failure. LVEF pending. Pt resides in [place] & has hx mild dementia. Plan to consult cardiology. Will follow.

## 2021-10-21 NOTE — H&P
[facility]  [address] [facility], [facility] [address]  [phone]    Admission History and Physical      NAME:  Brock Faith   :   10/9/1930   MRN:  [id number]     PCP:  Sonal Carver MD     Date of service:  10/20/2021         Subjective:     CHIEF COMPLAINT: Chest pain    HISTORY OF PRESENT ILLNESS:     Ms. Prema Moreno is a 80 y.o.  female who is admitted with NSTEMI. Ms. Prema Moreno with past medical history of DM, CHF, hypertension, hypothyroidism was brought to ER complaining of chest pain, which started about 2 days ago. The pain is sharp, midsternal with radiation to left arm and associated with shortness of breath and vomiting. Denies cough. Patient was tachypneic in ER. Patient was admitted at [address] [place] [address] at the end of September for COPD exacerbation. Past Medical History:   Diagnosis Date    Dementia ([address]     Hypertension     Hypothyroid     Ill-defined condition     chronic back pain        Past Surgical History:   Procedure Laterality Date    HX  SECTION         Social History     Tobacco Use    Smoking status: Never Smoker   Substance Use Topics    Alcohol use: No        History reviewed. No pertinent family history. No Known Allergies     Prior to Admission medications    Medication Sig Start Date End Date Taking? Authorizing Provider   labetalol (NORMODYNE) 100 mg tablet Take 1 Tab by mouth every twelve (12) hours. 1/3/16   Asia Carrasquillo MD   L. acidoph & paracasei- S therm- Bifido (RYAN-Q) 8 billion cell cap cap Take 1 Cap by mouth daily. 1/3/16   Asia Carrasquillo MD   oxyCODONE-acetaminophen (PERCOCET) 5-325 mg per tablet Take 1 Tab by mouth every six (6) hours as needed for Pain. Max Daily Amount: 4 Tabs. 1/3/16   Asia Carrasquillo MD   senna-docusate (SENNA WITH DOCUSATE SODIUM) 8.6-50 mg per tablet Take 1 Tab by mouth daily.  1/3/16   Bhavik Carrasquillo MD   ondansetron (ZOFRAN ODT) 4 mg disintegrating tablet Take 1 Tab by mouth every eight (8) hours as needed for Nausea. 1/3/16   Juanis Carrasquillo MD   levothyroxine (SYNTHROID) 25 mcg tablet Take 25 mcg by mouth Daily (before breakfast). Glenn Traore MD   cholecalciferol, vitamin D3, 50,000 unit tab Take 1 Tab by mouth every fourteen (14) days. Glenn Traore MD   timolol (TIMOPTIC) 0.5 % ophthalmic solution Administer 1 Drop to both eyes nightly. Glenn Traore MD   cholecalciferol (VITAMIN D3) 1,000 unit tablet Take 1,000 Units by mouth every other day. Provider, Historical   co-enzyme Q-10 (CO Q-10) 100 mg capsule Take 100 mg by mouth daily. Provider, Historical   red yeast rice extract 600 mg cap Take 600 mg by mouth daily. Provider, Historical   amLODIPine (NORVASC) 10 mg tablet Take 10 mg by mouth daily. Glenn Traore MD   traMADol (ULTRAM) 50 mg tablet Take 50 mg by mouth two (2) times a day.  50-100mg    Glenn Traore MD         Review of Systems:  (bold if positive, if negative)    Gen:  Eyes:  ENT:  CVS:  Pulm:   dyspneaGI:  :  MS:  Skin:  Psych:  Endo:  Hem:  Renal:  Neuro:            Objective:      VITALS:    Vital signs reviewed; most recent are:    Visit Vitals  BP (!) 140/105 (BP 1 Location: Right upper arm, BP Patient Position: At rest;Sitting)   Pulse 97   Temp 98 °F (36.7 °C)   Resp 22   Ht 5' (1.524 m)   Wt 65.8 kg (145 lb)   SpO2 98%   BMI 28.32 kg/m²     SpO2 Readings from Last 6 Encounters:   10/20/21 98%   01/03/16 98%   05/31/14 96%    O2 Flow Rate (L/min): 3 l/min   No intake or output data in the 24 hours ending 10/20/21 3964         Exam:     Physical Exam:    Gen:  Well-developed, well-nourished, in no acute distress  HEENT:  Pink conjunctivae, PERRL, hearing intact to voice, moist mucous membranes  Neck:  Supple, without masses, thyroid non-tender  Resp:  No accessory muscle use,BL rales    Card: Grade 3/6 systolic murmur, normal S1, S2 without thrills, bruits or peripheral edema  Abd:  Soft, non-tender, non-distended, normoactive bowel sounds are present, no palpable organomegaly and no detectable hernias  Lymph:  No cervical or inguinal adenopathy  Musc:  No cyanosis or clubbing  Skin:  No rashes or ulcers, skin turgor is good  Neuro:  Cranial nerves are grossly intact, no focal motor weakness, follows commands appropriately  Psych:  Good insight, oriented to person, place and time, alert       Labs:    Recent Labs     10/20/21  2047   WBC 12.6*   HGB 10.0*   HCT 32.5*        Recent Labs     10/20/21  2047      K 3.5      CO2 28   *   BUN 32*   CREA 2.51*   CA 8.2*   ALB 2.0*   TBILI 0.4   ALT 33     Lab Results   Component Value Date/Time    Glucose (POC) 119 (H) 11/28/2009 08:14 PM     No results for input(s): PH, PCO2, PO2, HCO3, FIO2 in the last 72 hours. No results for input(s): INR, INREXT in the last 72 hours. Telemetry reviewed:   normal sinus rhythm       Assessment/Plan:    1. NSTEMI (non-ST elevated myocardial infarction) ([facility] (10/20/2021)/ Elevated troponin (10/20/2021)/ Elevated brain natriuretic peptide (BNP) level (10/20/2021). Admit to telemetry. Monitor serial troponin. Check echocardiogram and start Bumex IV twice daily. Check daily weight, I's/O. Start heparin drip. Consult cardiology, Dr. Bryan Mejia      2. CHF exacerbation/acute pulmonary edema. Unknown EF. Check echocardiogram.  Rapid Covid test is negative. Continue treatment as above      3. Acute on chronic renal failure stage III. Watch renal function on diuretics. If worsening consult nephrology    4. Acquired hypothyroidism (12/27/2015). Continue Synthroid    5. HTN (hypertension) (12/27/2015). Continue home medications    6. Dementia ([facility] (10/20/2021), mild. Continue supportive care      7. Difficulty swallowing. According to her daughter, patient has difficulty swallowing.   Keep n.p.o. and consult SLP    CODE STATUS: DNR(decision-maker: Daughter)    Previous medical records reviewed     Risk of deterioration: high      Total time spent with patient: 79 [address] discussed with: Patient, Nursing Staff and >50% of time spent in counseling and coordination of care    Discussed:  Care Plan    Prophylaxis:  Hep SQ    Probable Disposition:  Home w/Family           ___________________________________________________    Attending Physician: Harley Pimentel MD

## 2021-10-21 NOTE — PROGRESS NOTES
Cardiology in ER to assess patient, wants a Cardizem bolus given and a Cardizem drip started, charge Nurse Anabell Fitzgerald made aware that I need to give report to an ER nurse on this patient.

## 2021-10-21 NOTE — PROGRESS NOTES
Physician Progress Note      PATIENT:               Kendall Shen  CSN #:                  [id number]  :                       10/9/1930  ADMIT DATE:       10/20/2021 8:25 PM  [address] [place] DATE:  RESPONDING  PROVIDER #:        Berkley WISEMAN MD          QUERY TEXTOrrin Bonus Afternoon    This patient admitted per H&P for NSTEMI. Cardiology was consulted and is not diagnosis this as NSTEMI, but stating this as \"Non-ACS troponin elevation 2nd to Myocardial strain , type 2 MI. Because there is conflicting diagnosis between the attending and the cardiologist regarding the type of MI, a query is needed for the attending to clarify. If possible, please document in progress notes and discharge summary if you are evaluating and /or treating any of the following: The medical record reflects the following:  Risk Factors: Elderly 80 yr old with FRED on CKD, CHF, HTN , DM  Clinical Indicators: Presented with CP, and pain with radiation to left arm with SOB and vomiting.  Troponin 851---916---806, Cards consulted and notes \" Elevated high sensitivity troponin-flat, probable non-ACS troponin elevation 2nd to Myocardial strain -type 2 MI , EKG noted Normal sinus , left ventricular hypertrophy with repolarization abnormality  Treatment: IV heparin, cards consulted, Echo pending, IV Bumex  BID daily weight IIos    Thank you  Ginger Holm,RN, [facility], [facility], [facility]  [phone]  Options provided:  -- After study, a type 2 MI due to myocardial strain from CHF exacerbation  confirmed and NSTEMI has been ruled out  -- After study, NSTEMI is  confirmed Type 2 MI due to myocardial strain from CHF exacerbation has been ruled out  -- Other - I will add my own diagnosis  -- Disagree - Not applicable / Not valid  -- Disagree - Clinically unable to determine / Unknown  -- Refer to Clinical Documentation Reviewer    PROVIDER RESPONSE TEXT:    After study, NSTEMI is confirmed and Type 2 MI due to myocardial strain from CHF exacerbation has been ruled out. Query created by: Brittany Arguello on 10/21/2021 12:44 PM      QUERY TEXT:    Good Afternoon    This patient admitted for MI. The patient is noted with SOB with Chest pain and resp up to 37, HR 90-140s. o2 sats 89% while on 3 liters o2. If possible, please document in the progress notes and discharge summary if you are evaluating and/or treating any of the following: The medical record reflects the following:  Risk Factors: Elderly, CHF  FRED on CKD stage 3  recent admission for COPD  Clinical Indicators: No documentation of chronic o2 requirements, now requiring 3 liters o2 with sats 89%, resp. rate up to29- 37, and HR 90-140ss, SOB , CXR with acute pulmonary edema  Treatment: O2 supplement, o2 oximetry, IV bumex, CXR, Echo pending, Cont. heparin drip, ASA, BB and Statins, Cards . pt is DNR and Pallative is following.     Thank you  Parish CarranzaEd [facility] ,[address] [facility], [address], [address] [place] [address]  Options provided:  -- Acute respiratory failure with hypoxia  -- Other - I will add my own diagnosis  -- Disagree - Not applicable / Not valid  -- Disagree - Clinically unable to determine / Unknown  -- Refer to Clinical Documentation Reviewer    PROVIDER RESPONSE TEXT:    Acute hypoxia    Query created by: Brittany Arguello on 10/21/2021 12:52 PM      Electronically signed by:  Chris Way MD 10/21/2021 1:48 PM

## 2021-10-21 NOTE — NURSE NAVIGATOR
Chart reviewed by Heart Failure Nurse Navigator. Heart Failure database completed. EF:  Current Echo is Pending. ACEi/ARB/ARNi: **    BB: Metoprolol tartrate 12.5 mg BID    Aldosterone Antagonist: **    Obstructive Sleep Apnea Screening:   STOP-BANG score:   Referred to Sleep Medicine:     CRT **. NYHA Functional Class **. Heart Failure Teach Back in Patient Education. Heart Failure Avoiding Triggers on Discharge Instructions. Cardiologist: Dr Shyla Santizo consulted    Post discharge follow up phone call to be made within 48-72 hours of discharge.

## 2021-10-21 NOTE — PROGRESS NOTES
It was noted that the patient has skin break on her bottom with some minimal bleeding. I spoke with the daughter and explained the benefits of heparin drip for about 24 hours and explained the risk. She understands and okay to start heparin drip for now. Will monitor closely and if bleeding starts will stop heparin drip immediately.

## 2021-10-21 NOTE — PROGRESS NOTES
Spiritual Care Assessment/Progress Note  Jazz David      NAME: Herb St      MRN: [id number]  AGE: 80 y.o. SEX: female  Jehovah's witness Affiliation: Rastafarian   Language: English     10/21/2021     Total Time (in minutes): 15     Spiritual Assessment begun in [facility] EMERGENCY DEPT through conversation with:         [x]Patient        [] Family    [] Friend(s)        Reason for Consult: Palliative Care, Initial/Spiritual Assessment     Spiritual beliefs: (Please include comment if needed)     [] Identifies with a cristofer tradition:         [] Supported by a cristofer community:            [] Claims no spiritual orientation:           [] Seeking spiritual identity:                [] Adheres to an individual form of spirituality:           [x] Not able to assess:                           Identified resources for coping:      [] Prayer                               [] Music                  [] Guided Imagery     [] Family/friends                 [] Pet visits     [] Devotional reading                         [x] Unknown     [] Other:                                               Interventions offered during this visit: (See comments for more details)    Patient Interventions: Other (comment) (Attempt)           Plan of Care:     [] Support spiritual and/or cultural needs    [] Support AMD and/or advance care planning process      [] Support grieving process   [] Coordinate Rites and/or Rituals    [] Coordination with community clergy   [] No spiritual needs identified at this time   [] Detailed Plan of Care below (See Comments)  [] Make referral to Music Therapy  [] Make referral to Pet Therapy     [] Make referral to Addiction services  [] Make referral to [facility]  [] Make referral to Spiritual Care Partner  [] No future visits requested        [x] Follow up upon further referrals     Comments:   attempted visiting patient, Miss Mariella Middleton, in the ER, for palliative care initial assessment.  Per nurse, she was on a treatment at this time, and was thus not a good time to visit. Nurse indicated patient's daughter was in the ER waiting area, but  couldn't locate her there. Advised nurse to contact [facility] for any further referrals. Visited by: Alban Herzog.    Paging Service: [phone])

## 2021-10-21 NOTE — ED NOTES

## 2021-10-21 NOTE — TELEPHONE ENCOUNTER
Call received from call center asking about this patient, however was disconnected prior to me being able to speak with anyone. Pt is currently in ED. Called ED, spoke to her nurse:  Put on Cardizem drip; orders were placed; they were able to get it addressed.

## 2021-10-21 NOTE — ED PROVIDER NOTES
Call to mom who verbalized understanding.    Patient is a 80-year-old female with a complex medical history including dementia, CHF with recent hospitalization for volume overload presented to the ED with shortness of breath and some hypoxia requiring 3 L nasal cannula oxygen. Patient evaluated and in no acute distress, ABCs intact. However per family due to patient's dementia she does not complain about things. EKG obtained with some concerning ST elevations in V1 and V2 with some reciprocal changes, code STEMI called. Cardiology evaluated the EKGs but felt STEMI was not present at this time and recommend medical management. Code STEMI canceled    The history is provided by the patient and a relative. The history is limited by the condition of the patient. Shortness of Breath  This is a recurrent problem. The problem occurs continuously. The current episode started more than 2 days ago. The problem has been rapidly worsening. Associated symptoms include cough and leg swelling. Pertinent negatives include no fever, no rhinorrhea, no sputum production, no chest pain, no syncope and no vomiting. It is unknown what precipitated the problem. Risk factors: Elderly, CHF. She has tried nothing for the symptoms. The treatment provided no relief. Past Medical History:   Diagnosis Date    Dementia ([address]     Hypertension     Hypothyroid     Ill-defined condition     chronic back pain       Past Surgical History:   Procedure Laterality Date    HX  SECTION           History reviewed. No pertinent family history.     Social History     Socioeconomic History    Marital status:      Spouse name: Not on file    Number of children: Not on file    Years of education: Not on file    Highest education level: Not on file   Occupational History    Not on file   Tobacco Use    Smoking status: Never Smoker   Substance and Sexual Activity    Alcohol use: No    Drug use: No    Sexual activity: Not on file   Other Topics Concern    Not on file Social History Narrative    Not on file     Social Determinants of Health     Financial Resource Strain:     Difficulty of Paying Living Expenses:    Food Insecurity:     Worried About Running Out of Food in the Last Year:     [address] in the Last Year:    Transportation Needs:     Lack of Transportation (Medical):  Lack of Transportation (Non-Medical):    Physical Activity:     Days of Exercise per Week:     Minutes of Exercise per Session:    Stress:     Feeling of Stress :    Social Connections:     Frequency of Communication with Friends and Family:     Frequency of Social Gatherings with Friends and Family:     Attends [employer] Services:     Active Member of Clubs or Organizations:     Attends Club or Organization Meetings:     Marital Status:    Intimate Partner Violence:     Fear of Current or Ex-Partner:     Emotionally Abused:     Physically Abused:     Sexually Abused: ALLERGIES: Patient has no known allergies. Review of Systems   Constitutional: Positive for activity change, appetite change, chills and fatigue. Negative for fever. HENT: Negative. Negative for rhinorrhea. Eyes: Negative. Respiratory: Positive for cough and shortness of breath. Negative for sputum production. Cardiovascular: Positive for leg swelling. Negative for chest pain and syncope. Gastrointestinal: Negative. Negative for vomiting. Endocrine: Negative. Genitourinary: Negative. Musculoskeletal: Negative. Skin: Negative. Allergic/Immunologic: Negative. Neurological: Negative. Hematological: Negative. Psychiatric/Behavioral: Negative. Vitals:    10/20/21 2300 10/20/21 2358 10/21/21 0000 10/21/21 0030   BP: (!) 158/97  (!) 156/60 (!) 147/63   Pulse: 93  92 90   Resp: 21  (!) 33 23   Temp:       SpO2: 96%  93% 97%   Weight:  86.3 kg (190 lb 4.1 oz)     Height:                Physical Exam  Vitals and nursing note reviewed.    Constitutional: General: She is not in acute distress. Appearance: Normal appearance. She is obese. She is ill-appearing. HENT:      Head: Normocephalic and atraumatic. Right Ear: External ear normal.      Left Ear: External ear normal.      Nose: Nose normal.      Mouth/Throat:      Mouth: Mucous membranes are moist.      Pharynx: Oropharynx is clear. No posterior oropharyngeal erythema. Eyes:      Extraocular Movements: Extraocular movements intact. Conjunctiva/sclera: Conjunctivae normal.   Cardiovascular:      Rate and Rhythm: Normal rate. Pulses: Normal pulses. Heart sounds: Normal heart sounds. Pulmonary:      Effort: Pulmonary effort is normal. Tachypnea present. Breath sounds: Decreased breath sounds present. Chest:      Chest wall: No deformity or tenderness. Abdominal:      General: Abdomen is flat. Bowel sounds are normal. There is no distension. Palpations: Abdomen is soft. Tenderness: There is no abdominal tenderness. Musculoskeletal:         General: No deformity or signs of injury. Normal range of motion. Cervical back: Normal range of motion and neck supple. No tenderness. Skin:     General: Skin is warm and dry. Capillary Refill: Capillary refill takes less than 2 seconds. Neurological:      General: No focal deficit present. Mental Status: She is alert. Cranial Nerves: No cranial nerve deficit.    Psychiatric:         Mood and Affect: Mood normal.         Behavior: Behavior normal.          [facility]  ED Course as of Oct 21 0139   Wed Oct 20, 2021   2155 Creatinine(!): 2.51 [AL]   2155 Troponin-High Sensitivity(!!): 851 [AL]   2155 WBC(!): 12.6 [AL]   2202 NT pro-BNP(!): >35,000 [AL]      ED Course User Index  [AL] Claudio Card MD     LABORATORY RESULTS:  Labs Reviewed   CBC WITH AUTOMATED DIFF - Abnormal; Notable for the following components:       Result Value    WBC 12.6 (*)     RBC 3.48 (*)     HGB 10.0 (*)     HCT 32.5 (*)     RDW 17.2 (*)     MONOCYTES 2 (*)     METAMYELOCYTES 1 (*)     MYELOCYTES 1 (*)     ABS. NEUTROPHILS 9.5 (*)     All other components within normal limits   METABOLIC PANEL, COMPREHENSIVE - Abnormal; Notable for the following components:    Glucose 159 (*)     BUN 32 (*)     Creatinine 2.51 (*)     GFR est AA 22 (*)     GFR est non-AA 18 (*)     Calcium 8.2 (*)     Albumin 2.0 (*)     Globulin 4.9 (*)     A-G Ratio 0.4 (*)     All other components within normal limits   NT-PRO BNP - Abnormal; Notable for the following components:    NT pro-BNP >35,000 (*)     All other components within normal limits   TROPONIN-HIGH SENSITIVITY - Abnormal; Notable for the following components:    Troponin-High Sensitivity 851 (*)     All other components within normal limits   COVID-19 RAPID TEST   SAMPLES BEING HELD   TROPONIN-HIGH SENSITIVITY   POC FECAL OCCULT BLOOD       IMAGING RESULTS:  XR CHEST PORT   Final Result      Interstitial process compatible with interstitial edema, interstitial pneumonia,   or an interstitial inflammatory process. Clinical correlation needed. MEDICATIONS GIVEN:  Medications - No data to display    Differential diagnosis: ACS, CHF exacerbation, hypoxia, COVID-19    ED physician interpretation of imaging: Chest x-ray concerning pulmonary edema  ED physician interpretation of EKG: Elevations in V1 and V2 and some reciprocals in the inferior leads. Code STEMI called, EKG evaluated by cardiology. Do not feel STEMI present at this time. Sinus rhythm, regular, rate 97  ED physician interpretation of laboratory results: Elevated troponin, significantly elevated BNP. Difficult to ascertain severity and chronicity due to patient's reported CHF. Creatinine elevated 2.51 no recent values in EMR. Consistent with FRED but may be chronic.     MDM: Patient is a 51-year-old female with a complex medical history including dementia presented to ED with shortness of breath hypoxia requiring nasal cannula oxygen, cardiology evaluation and hospital admission for heart failure, ACS. Patient's vital signs are stable, patient is afebrile. Patient's physical exam is remarkable for a moderately uncomfortable woman with tachypnea. Hypoxia improved with nasal cannula oxygen. Patient admitted to the hospitalist service for further treatment and evaluation. DISPOSITION: Admitted    [facility] for Admission  10:07 PM    ED Room Number: ER16/16  Patient Name and age: Milan Braden 80 y.o.  female  Working Diagnosis:   1. Hypoxia    2. Acute on chronic congestive heart failure, unspecified heart failure type (HCC)    3. Elevated troponin    4. Tachypnea        COVID-19 Suspicion:  no  Sepsis present:  no  Reassessment needed: no  Code Status:  Full Code  Readmission: no  Isolation Requirements:  no  Recommended Level of Care:  telemetry  Department:  [facility] - [phone]    Other: Patient presented to the ED with shortness of breath and dyspnea found to be hypoxic requiring 2 L nasal cannula. Patient's EKG was concerning for possible STEMI, code STEMI called and EKGs reviewed by cardiology team.  They do not feel she is having a STEMI at this time as she does not have chest pain as well as her EKG findings. They recommend trending troponins and medical management including heparin if necessary. Patient has significant CHF difficult ascertain whether patient's troponin is her baseline at 851 elevated. Repeat troponin ordered. Total critical care time spent exclusive of procedures:  43 minutes. Chilo Rucker.  Tramaine Doherty MD        Critical Care  Performed by: Ynes Sotelo MD  Authorized by: Ynes Sotelo MD     Critical care provider statement:     Critical care time (minutes):  43    Critical care start time:  10/20/2021 8:44 PM    Critical care end time:  10/20/2021 10:10 AM    Critical care time was exclusive of:  Separately billable procedures and treating other patients and teaching time Critical care was necessary to treat or prevent imminent or life-threatening deterioration of the following conditions:  Cardiac failure and respiratory failure    Critical care was time spent personally by me on the following activities:  Development of treatment plan with patient or surrogate, discussions with consultants, discussions with primary provider, evaluation of patient's response to treatment, examination of patient, interpretation of cardiac output measurements, obtaining history from patient or surrogate, ordering and performing treatments and interventions, ordering and review of laboratory studies, ordering and review of radiographic studies and pulse oximetry    I assumed direction of critical care for this patient from another provider in my specialty: no

## 2021-10-21 NOTE — PROGRESS NOTES
Problem: Dysphagia (Adult)  Goal: *Acute Goals and Plan of Care (Insert Text)  Description: Swallowing goals initiated 10-21-21:  1) tolerate soft and bite sized, thins without significant s/s aspiraton by 10-22-21  Outcome: Progressing Towards Goal   SPEECH LANGUAGE PATHOLOGY BEDSIDE SWALLOW EVALUATION  Patient: Fly Dunham (72 y.o. female)  Date: 10/21/2021  Primary Diagnosis: Elevated troponin [R77.8]        Precautions: aspiration       ASSESSMENT :  Based on the objective data described below, the patient presents with aspiration risks due that are most likely chronic in nature due to her respiratory disease. She may have difficulty cessating respiration for deglutition due to high respiratory rate and WOB. Family reports that dysphagia is chronic and she has refused all radiological workups for swallowing in the past .  ADmitted 10-20-21 with SOB, hypoxia. PMH: COPD with recent admission to [address] [place] [address],  dementia, DM, HTN, hypothyroid. .    Patient will benefit from skilled intervention to address the above impairments. Patients rehabilitation potential is considered to be Fair     PLAN :  Recommendations and Planned Interventions:  Continue soft and bite sized diet. Crush pills in pudding if able. Upright during and 20 min after all PO. Try to avoid taking PO if she is breathing faster then 25 breaths per minute  Frequency/Duration: Patient will be followed by speech-language pathology 1 time a week to address goals.   Discharge Recommendations: None     SUBJECTIVE:   Patient stated That looks like cat food.-the chopped chicken    OBJECTIVE:     Past Medical History:   Diagnosis Date    Dementia ([address]     Hypertension     Hypothyroid     Ill-defined condition     chronic back pain     Past Surgical History:   Procedure Laterality Date    HX  SECTION       Prior Level of Function/Home Situation: lives alone with 24 hour caregivers     Diet prior to admission: soft, thins  Current Diet: Soft and bite sized, thins   Cognitive and Communication Status:  Neurologic State: Alert (baseline dementia)  Orientation Level: Oriented to person, Oriented to place  Cognition: Impulsive, Impaired decision making, Memory loss, Follows commands  Perception: Appears intact        Oral Assessment:     P.O. Trials:  Patient Position: upright in bed  Vocal quality prior to P.O.:  (breathy, mildly hoarse)  Consistency Presented: Thin liquid;Mechanical soft (seen at end of lunch)  How Presented: Self-fed/presented;SLP-fed/presented;Cup/gulp; Spoon (daughter fed. patient feeds self at home. she held cup for sips)   ORAL PHASE:   Bolus Acceptance:  (not interested in solid PO x for peaches)  Bolus Formation/Control: Impaired (reduced chew)     Propulsion: No impairment  Oral Residue: None  PHARYNGEAL PHASE:   Initiation of Swallow:  (suspect difficulty cessating respiration for deglutition at times due to RR of 28-35 and WOB)     Aspiration Signs/Symptoms: Change vocal quality;Clear throat  Pharyngeal Phase Characteristics:  (daughter reports h/o dysphagia-some esophageal-some oral-pharyngeal. She has refused all x-ray swallowing work ups in the past)         SPEECH:   Voice hoarse and breathy. She could only produced 2-4 syllables per breath due to rapid RR and WOB. Daughter reports that she can usually produce full sentences. She lives with 24 hour caregivers who cook for her. She feeds herself. NOMS:   The NOMS functional outcome measure was used to quantify this patient's level of swallowing impairment. Based on the NOMS, the patient was determined to be at level 5 for swallow function       NOMS Swallowing Levels:  Level 1 (CN): NPO  Level 2 (CM): NPO but takes consistency in therapy  Level 3 (CL): Takes less than 50% of nutrition p.o. and continues with nonoral feedings; and/or safe with mod cues; and/or max diet restriction  Level 4 (CK):  Safe swallow but needs mod cues; and/or mod diet restriction; and/or still requires some nonoral feeding/supplements  Level 5 (CJ): Safe swallow with min diet restriction; and/or needs min cues  Level 6 (CI): Independent with p.o.; rare cues; usually self cues; may need to avoid some foods or needs extra time  Level 7 ([address] [place] [address]): Independent for all p.o.  [place]. (2003). National Outcomes Measurement System (NOMS): Adult Speech-Language Pathology User's Guide. Pain:  Pain Scale 1: Numeric (0 - 10)  Pain Intensity 1: 0       After treatment:   Patient left in no apparent distress in bed and Nursing notified    COMMUNICATION/EDUCATION:   Patient was educated regarding her deficit(s) of dysphagia  as this relates to her diagnosis of hypoxia, SOB. She demonstrated Guarded understanding as evidenced by reduced insight into defiicts. FAmily present and understood. .    The patient's plan of care including recommendations, planned interventions, and recommended diet changes were discussed with: Registered nurse. Patient/family have participated as able in goal setting and plan of care. Patient/family agree to work toward stated goals and plan of care.     Thank you for this referral.  Ewa Toribio, SIN  Time Calculation: 15 mins

## 2021-10-21 NOTE — ED TRIAGE NOTES
Patient reports SOB since Sunday after D/C from [facility] for fluid overload. Patient is 88% on RA in Triage, placed on 3L via NC. Patient has Hx of Dementia at baseline. Patient denies chest pain in ED. Per family patient was compiling of chest pain radiating to the left arm x 1 week. Family repost noted rectal bleeding today.

## 2021-10-21 NOTE — CONSULTS
Palliative Medicine Consult  Gerald: [phone] ([phone])    Patient Name: Chris Owen  YOB: 1930    Date of Initial Consult: 10/21/2021  Reason for Consult: Goals of care discussion  Requesting Provider: Dr. Cristina Christian and Dr. Judith Contreras  Primary Care Physician: Yasmine Quintero MD     SUMMARY:   Chris Owen is a 80 y.o. with a past history of dementia  (mild ), DM, CKD, COPD, CHF, HTN, stroke? (10/2020), hypothyroidism, frequent UTIs, who was admitted on 10/20/2021 from home with a diagnosis of hypoxia, acute on chronic CHF, NSTEMI and FRED. Ms. Saran Srivastava presented to the ER with CC of chest pain x2 days. Patient recently hospitalized at [address] [place] [address]  - 2021, for CHF    In ER patient hypoxic, requiring 3 L nasal cannula. Code STEMI called. Course of hospitalization: Cardiology following, patient went into A. fib with RVR and was started on Cardizem drip. Echo with EF 20-25%. Speech pathologist assessed dysphagia, noted respiratory status increased risk of aspiration, recommended soft bite-size diet. Current medical issues leading to Palliative Medicine involvement include: Goals of care discussion in setting of advanced age, multiple comorbidities including end-stage cardiac disease. Psychosocial history: From [place], , 79 years,   in the last year or 2 from lung cancer. Has 2 children, son and daughter. Patient lives in her own home, has hired caregivers around the clock. She has a elevator chair that allows her to go upstairs. Baseline cognitive and functional status: Forgetful, urine incontinence, uses elevator chair for stairs, has assistance with bathing and dressing     PALLIATIVE DIAGNOSES:   1. Palliative care encounter  2. Concerned about end-of-life  3. Shortness of breath  4. Advance care planning discussion  5. Goals of care discussion       PLAN:   1.  Prior to visit completed extensive chart review, including review of documentation, vitals, Mars and results of labs and other diagnostics. I spoke with patient's nurse prior to visit. 2. Patient was seen, sitting up in bed awake, alert and has fair understanding of hospitalization. However, when I asked patient if the doctor told her the results of the echo, she stated that she was told that everything was okay. 3.   Patient shared with me that her mother  at the age of 80, so it is her goal to live until she is 80years old. Though I did not discuss the results of the echo with the patient, preferring to wait until family present, we did discuss quality of life versus quantity of life and patient was very clear that despite her goal to live until she is 80, she wants the \"trip\" to be comfortable, it would not be worth it otherwise, meaning quality of her time spent is more important to her than the quantity. 4. Shortness of breath-improved? Requiring 3 L nasal cannula. Etiology includes CHF and COPD. Patient denied feeling short of breath, however she appears dyspneic and has significant speech fragmentation during conversation. She denies using supplemental O2 at home. 5. Initially plan was to return to patient's room once family at bedside, however when I left the room I spoke with unit  and [id number] RN, and was told that hospice met with patient's son and daughter and plan is to discharge home with hospice, which does seem to be in alignment with patient's expressed wish for quality versus quantity. 6. Advance care planning discussion-patient has an AMD in the EMR, she has designated her daughter Micheline Person to be primary healthcare decision-maker and son Juliette Vaughn to be secondary. 7. Initial consult note routed to primary continuity provider and/or primary health care team members  8.  Communicated plan of care with: Palliative IDT, [facility] Team, unit RN, unit  and [facility] / TREATMENT PREFERENCES:     GOALS OF CARE: Comfort care with hospice       TREATMENT PREFERENCES:   Code Status: DNR    Patient's personal goals include: Lived until she is 80years old    Important upcoming milestones or family events: Unknown    The patient identifies the following as important for living well: Being able to stay in my own home and feel good the majority of the time      Advance Care Planning:  [x] [facility] Interdisciplinary Team has updated the ACP Navigator with [address] and Patient Capacity      Advance Care Planning 12/27/2015   Patient's Healthcare Decision Maker is: Verbal statement (Legal Next of Kin remains as decision maker)   Primary Decision Maker Name Wanda Mistry   Primary Decision Maker Phone Number [phone]   Primary Decision Maker Relationship to Patient Adult child   Secondary Decision Maker Name Angie Cardoso   Secondary Decision Maker Phone Number [phone]   Secondary Decision Maker Relationship to Patient Adult child   Confirm Advance Directive None   Patient Would Like to Complete Advance Directive No               Other:    As far as possible, the palliative care team has discussed with patient / health care proxy about goals of care / treatment preferences for patient.      HISTORY:     History obtained from: Medical records/nurse/patient    CHIEF COMPLAINT: Denied    HPI/SUBJECTIVE:    The patient is:   [x] Verbal and participatory  [] Non-participatory due to:   Patient denied pain, denied shortness of breath furthered having good appetite, acknowledges some forgetfulness but able to tell me about recent hospitalization at [facility] Pain Assessment (nonverbal scale for severity on nonverbal patients):              Duration: for how long has pt been experiencing pain (e.g., 2 days, 1 month, years)  Frequency: how often pain is an issue (e.g., several times per day, once every few days, constant)     FUNCTIONAL ASSESSMENT:     Palliative Performance Scale (PPS): 40          PSYCHOSOCIAL/SPIRITUAL SCREENING:     Palliative IDT has assessed this patient for cultural preferences / practices and a referral made as appropriate to needs (Cultural Services, Patient Advocacy, Ethics, etc.)    Any spiritual / [employer]alian concerns:  [] Yes /  [x] No    Caregiver Burnout:  [] Yes /  [] No /  [x] No Caregiver Present      Anticipatory grief assessment:   [x] Normal  / [] Maladaptive       ESAS Anxiety:      ESAS Depression:          REVIEW OF SYSTEMS:     Positive and pertinent negative findings in ROS are noted above in HPI. The following systems were [x] reviewed / [] unable to be reviewed as noted in HPI  Other findings are noted below. Systems: constitutional, ears/nose/mouth/throat, respiratory, gastrointestinal, genitourinary, musculoskeletal, integumentary, neurologic, psychiatric, endocrine. Positive findings noted below. Modified ESAS Completed by: provider                                   Stool Occurrence(s): 1        PHYSICAL EXAM:     From RN flowsheet:  Wt Readings from Last 3 Encounters:   10/21/21 190 lb 4.1 oz (86.3 kg)   12/27/15 145 lb (65.8 kg)   05/31/14 145 lb (65.8 kg)     Blood pressure 131/71, pulse (!) 148, temperature 98.5 °F (36.9 °C), resp. rate (!) 33, height 5' (1.524 m), weight 190 lb 4.1 oz (86.3 kg), SpO2 94 %.     Pain Scale 1: Numeric (0 - 10)  Pain Intensity 1: 0  Pain Onset 1: 3 weeks  Pain Location 1: Chest  Pain Orientation 1: Anterior  Pain Description 1: Aching  Pain Intervention(s) 1: Nurse notified  Last bowel movement, if known:     Constitutional: Appears stated age, obese, alert, pleasant and conversant, NAD  Eyes: pupils equal, anicteric  ENMT: no nasal discharge, moist mucous membranes  Cardiovascular: regular rhythm, distal pulses intact  Respiratory: breathing mildly labored, symmetric, + speech fragmentation  Gastrointestinal: soft non-tender, +bowel sounds  Musculoskeletal: no deformity, no tenderness to palpation  Skin: warm, dry  Neurologic: following commands, moving all extremities  Psychiatric: full affect, no hallucinations  Other:       HISTORY:     Principal Problem:    Elevated troponin (10/20/2021)    Active Problems:    Acute on chronic renal failure ([facility] (2015)      Acquired hypothyroidism (2015)      HTN (hypertension) (2015)      Elevated brain natriuretic peptide (BNP) level (10/20/2021)      Dementia ([facility] (10/20/2021)      NSTEMI (non-ST elevated myocardial infarction) ([facility] (10/20/2021)      Past Medical History:   Diagnosis Date    Dementia ([facility]     Hypertension     Hypothyroid     Ill-defined condition     chronic back pain      Past Surgical History:   Procedure Laterality Date    HX  SECTION        History reviewed. No pertinent family history. History reviewed, no pertinent family history.   Social History     Tobacco Use    Smoking status: Never Smoker   Substance Use Topics    Alcohol use: No     No Known Allergies   Current Facility-Administered Medications   Medication Dose Route Frequency    sodium chloride (NS) flush 5-40 mL  5-40 mL IntraVENous Q8H    sodium chloride (NS) flush 5-40 mL  5-40 mL IntraVENous PRN    acetaminophen (TYLENOL) tablet 650 mg  650 mg Oral Q6H PRN    Or    acetaminophen (TYLENOL) suppository 650 mg  650 mg Rectal Q6H PRN    polyethylene glycol (MIRALAX) packet 17 g  17 g Oral DAILY PRN    ondansetron (ZOFRAN ODT) tablet 4 mg  4 mg Oral Q8H PRN    Or    ondansetron (ZOFRAN) injection 4 mg  4 mg IntraVENous Q6H PRN    aspirin chewable tablet 81 mg  81 mg Oral DAILY    metoprolol tartrate (LOPRESSOR) tablet 12.5 mg  12.5 mg Oral BID    atorvastatin (LIPITOR) tablet 20 mg  20 mg Oral QHS    guaiFENesin-dextromethorphan (ROBITUSSIN DM) 100-10 mg/5 mL syrup 10 mL  10 mL Oral Q6H PRN    albuterol-ipratropium (DUO-NEB) 2.5 MG-0.5 MG/3 ML  3 mL Nebulization Q4H PRN    melatonin (rapid dissolve) tablet 5 mg  5 mg Oral QHS PRN    dilTIAZem (CARDIZEM) 100 mg in 0.9% sodium chloride (MBP/ADV) 100 mL infusion  0-15 mg/hr IntraVENous TITRATE    levothyroxine (SYNTHROID) tablet 75 mcg  75 mcg Oral ACB    [START ON 10/22/2021] budesonide (PULMICORT) 500 mcg/2 ml nebulizer suspension  500 mcg Nebulization DAILY AFTER BREAKFAST    melatonin (rapid dissolve) tablet 5 mg  5 mg Oral QHS    metoprolol (LOPRESSOR) injection 5 mg  5 mg IntraVENous ONCE    bumetanide (BUMEX) injection 1 mg  1 mg IntraVENous BID    insulin lispro (HUMALOG) injection   SubCUTAneous Q6H    glucose chewable tablet 16 g  4 Tablet Oral PRN    dextrose (D50W) injection syrg 12.5-25 g  12.5-25 g IntraVENous PRN    glucagon (GLUCAGEN) injection 1 mg  1 mg IntraMUSCular PRN    heparin 25,000 units in D5W 250 ml infusion  12-25 Units/kg/hr IntraVENous TITRATE     Current Outpatient Medications   Medication Sig    levothyroxine (SYNTHROID) 75 mcg tablet Take 75 mcg by mouth Daily (before breakfast).  latanoprost (XALATAN) 0.005 % ophthalmic solution Administer 1 Drop to both eyes Daily (before breakfast).  furosemide (LASIX) 40 mg tablet Take 40 mg by mouth daily.  pantoprazole (Protonix) 40 mg tablet Take 40 mg by mouth daily.  carvediloL (Coreg) 6.25 mg tablet Take 6.25 mg by mouth two (2) times daily (with meals).  aspirin delayed-release 81 mg tablet Take 81 mg by mouth daily.  glimepiride (AMARYL) 2 mg tablet Take 4 mg by mouth daily.  OTHER,NON-FORMULARY, Take 1 Tablet by mouth daily. Multivitamin plus C    OTHER,NON-FORMULARY, Take 1 Dose by mouth daily. Probiotic digestive health    budesonide (PULMICORT) 0.5 mg/2 mL nbsp 500 mcg by Nebulization route daily (after breakfast).  budesonide (PULMICORT) 0.5 mg/2 mL nbsp 500 mcg by Nebulization route daily as needed (may be administered at 6 pm if the patient is having difficulty breathing).  melatonin 5 mg tablet Take 5 mg by mouth nightly.     Ibuprofen-diphenhydrAMINE (Advil PM) 200-38 mg tab Take 1 Tablet by mouth nightly.  albuterol (PROVENTIL VENTOLIN) 2.5 mg /3 mL (0.083 %) nebu 2.5 mg by Nebulization route every four (4) hours as needed for Wheezing.  fluticasone propionate (Flovent HFA) 220 mcg/actuation inhaler Take 2 Puffs by inhalation two (2) times daily as needed (trouble breathing). LAB AND IMAGING FINDINGS:     Lab Results   Component Value Date/Time    WBC 11.9 (H) 10/20/2021 11:27 PM    HGB 9.9 (L) 10/20/2021 11:27 PM    PLATELET 692 69/88/9339 11:27 PM     Lab Results   Component Value Date/Time    Sodium 145 10/20/2021 08:47 PM    Potassium 3.5 10/20/2021 08:47 PM    Chloride 107 10/20/2021 08:47 PM    CO2 28 10/20/2021 08:47 PM    BUN 32 (H) 10/20/2021 08:47 PM    Creatinine 2.51 (H) 10/20/2021 08:47 PM    Calcium 8.2 (L) 10/20/2021 08:47 PM    Magnesium 1.9 01/02/2016 05:18 AM    Phosphorus 3.2 01/02/2016 05:18 AM      Lab Results   Component Value Date/Time    Alk. phosphatase 67 10/20/2021 08:47 PM    Protein, total 6.9 10/20/2021 08:47 PM    Albumin 2.0 (L) 10/20/2021 08:47 PM    Globulin 4.9 (H) 10/20/2021 08:47 PM     Lab Results   Component Value Date/Time    aPTT 49.2 (H) 10/21/2021 01:47 PM      No results found for: IRON, FE, TIBC, IBCT, PSAT, FERR   No results found for: PH, PCO2, PO2  No components found for: GLPOC   No results found for: CPK, CKMB             Total time: 35 min  Counseling / coordination time, spent as noted above: 20 min  > 50% counseling / coordination?: yes    Prolonged service was provided for  []30 min   []75 min in face to face time in the presence of the patient, spent as noted above. Time Start:   Time End:   Note: this can only be billed with 73916 (initial) or 16807 (follow up). If multiple start / stop times, list each separately.

## 2021-10-21 NOTE — PROGRESS NOTES
Hospitalist Progress Note    NAME: August Vianney   :  10/9/1930   MRN:  [id number]     Subjective:   Daily Progress Note: 10/21/2021 11:43 AM      Chief complaint: Admitted with chest pain  Patient seen and examined chart reviewed. Patient still remains in the ER, case discussed with nursing staff and cardiology at bedside. Palliative has been consulted. Patient currently denies any chest pain nausea vomiting. No bleeding issues reported to me by staff at this time. Current Facility-Administered Medications   Medication Dose Route Frequency    sodium chloride (NS) flush 5-40 mL  5-40 mL IntraVENous Q8H    sodium chloride (NS) flush 5-40 mL  5-40 mL IntraVENous PRN    acetaminophen (TYLENOL) tablet 650 mg  650 mg Oral Q6H PRN    Or    acetaminophen (TYLENOL) suppository 650 mg  650 mg Rectal Q6H PRN    polyethylene glycol (MIRALAX) packet 17 g  17 g Oral DAILY PRN    ondansetron (ZOFRAN ODT) tablet 4 mg  4 mg Oral Q8H PRN    Or    ondansetron (ZOFRAN) injection 4 mg  4 mg IntraVENous Q6H PRN    perflutren lipid microspheres (DEFINITY) contrast injection 2 mL  2 mL IntraVENous ONCE    bumetanide (BUMEX) injection 1 mg  1 mg IntraVENous BID    insulin lispro (HUMALOG) injection   SubCUTAneous Q6H    glucose chewable tablet 16 g  4 Tablet Oral PRN    dextrose (D50W) injection syrg 12.5-25 g  12.5-25 g IntraVENous PRN    glucagon (GLUCAGEN) injection 1 mg  1 mg IntraMUSCular PRN    heparin 25,000 units in D5W 250 ml infusion  12-25 Units/kg/hr IntraVENous TITRATE     Current Outpatient Medications   Medication Sig    labetalol (NORMODYNE) 100 mg tablet Take 1 Tab by mouth every twelve (12) hours.  L. acidoph & paracasei- S therm- Bifido (RYAN-Q) 8 billion cell cap cap Take 1 Cap by mouth daily.  oxyCODONE-acetaminophen (PERCOCET) 5-325 mg per tablet Take 1 Tab by mouth every six (6) hours as needed for Pain. Max Daily Amount: 4 Tabs.     senna-docusate (SENNA WITH DOCUSATE SODIUM) 8.6-50 mg per tablet Take 1 Tab by mouth daily.  ondansetron (ZOFRAN ODT) 4 mg disintegrating tablet Take 1 Tab by mouth every eight (8) hours as needed for Nausea.  levothyroxine (SYNTHROID) 25 mcg tablet Take 25 mcg by mouth Daily (before breakfast).  cholecalciferol, vitamin D3, 50,000 unit tab Take 1 Tab by mouth every fourteen (14) days.  timolol (TIMOPTIC) 0.5 % ophthalmic solution Administer 1 Drop to both eyes nightly.  cholecalciferol (VITAMIN D3) 1,000 unit tablet Take 1,000 Units by mouth every other day.  co-enzyme Q-10 (CO Q-10) 100 mg capsule Take 100 mg by mouth daily.  red yeast rice extract 600 mg cap Take 600 mg by mouth daily.  amLODIPine (NORVASC) 10 mg tablet Take 10 mg by mouth daily.  traMADol (ULTRAM) 50 mg tablet Take 50 mg by mouth two (2) times a day.  50-100mg          Objective:     Visit Vitals  BP (!) 149/70   Pulse 92   Temp 98.5 °F (36.9 °C)   Resp (!) 31   Ht 5' (1.524 m)   Wt 86.3 kg (190 lb 4.1 oz)   SpO2 98%   BMI 37.16 kg/m²    O2 Flow Rate (L/min): 3 l/min O2 Device: Nasal cannula    Temp (24hrs), Av.3 °F (36.8 °C), Min:98 °F (36.7 °C), Max:98.5 °F (36.9 °C)        PHYSICAL EXAM:  General chronic ill looking  Neck Short  CVS RRR  Abdomen soft obese  Respiratory coarse sounds  Extremities moves all  Neuro alert, normal speech  Psych normal affect  Skin no visible rash        Data Review    Recent Results (from the past 24 hour(s))   EKG, 12 LEAD, INITIAL    Collection Time: 10/20/21  8:46 PM   Result Value Ref Range    Ventricular Rate 99 BPM    Atrial Rate 99 BPM    P-R Interval 176 ms    QRS Duration 90 ms    Q-T Interval 360 ms    QTC Calculation (Bezet) 462 ms    Calculated P Axis 51 degrees    Calculated R Axis -17 degrees    Calculated T Axis 156 degrees    Diagnosis       Normal sinus rhythm  Left ventricular hypertrophy with repolarization abnormality ( R in aVL ,   [place] product , [employer] )  Abnormal ECG  No previous ECGs available     CBC WITH AUTOMATED DIFF    Collection Time: 10/20/21  8:47 PM   Result Value Ref Range    WBC 12.6 (H) 3.6 - 11.0 K/uL    RBC 3.48 (L) 3.80 - 5.20 M/uL    HGB 10.0 (L) 11.5 - 16.0 g/dL    HCT 32.5 (L) 35.0 - 47.0 %    MCV 93.4 80.0 - 99.0 FL    MCH 28.7 26.0 - 34.0 PG    MCHC 30.8 30.0 - 36.5 g/dL    RDW 17.2 (H) 11.5 - 14.5 %    PLATELET 805 109 - 050 K/uL    MPV 9.9 8.9 - 12.9 FL    NRBC 0.0 0  WBC    ABSOLUTE NRBC 0.00 0.00 - 0.01 K/uL    NEUTROPHILS 71 32 - 75 %    BAND NEUTROPHILS 4 0 - 6 %    LYMPHOCYTES 18 12 - 49 %    MONOCYTES 2 (L) 5 - 13 %    EOSINOPHILS 3 0 - 7 %    BASOPHILS 0 0 - 1 %    METAMYELOCYTES 1 (H) 0 %    MYELOCYTES 1 (H) 0 %    IMMATURE GRANULOCYTES 0 %    ABS. NEUTROPHILS 9.5 (H) 1.8 - 8.0 K/UL    ABS. LYMPHOCYTES 2.3 0.8 - 3.5 K/UL    ABS. MONOCYTES 0.3 0.0 - 1.0 K/UL    ABS. EOSINOPHILS 0.4 0.0 - 0.4 K/UL    ABS. BASOPHILS 0.0 0.0 - 0.1 K/UL    ABS. IMM. GRANS. 0.0 K/UL    DF MANUAL      RBC COMMENTS ANISOCYTOSIS  1+        WBC COMMENTS TOXIC GRANULATION     METABOLIC PANEL, COMPREHENSIVE    Collection Time: 10/20/21  8:47 PM   Result Value Ref Range    Sodium 145 136 - 145 mmol/L    Potassium 3.5 3.5 - 5.1 mmol/L    Chloride 107 97 - 108 mmol/L    CO2 28 21 - 32 mmol/L    Anion gap 10 5 - 15 mmol/L    Glucose 159 (H) 65 - 100 mg/dL    BUN 32 (H) 6 - 20 MG/DL    Creatinine 2.51 (H) 0.55 - 1.02 MG/DL    BUN/Creatinine ratio 13 12 - 20      GFR est AA 22 (L) >60 ml/min/1.73m2    GFR est non-AA 18 (L) >60 ml/min/1.73m2    Calcium 8.2 (L) 8.5 - 10.1 MG/DL    Bilirubin, total 0.4 0.2 - 1.0 MG/DL    ALT (SGPT) 33 12 - 78 U/L    AST (SGOT) 17 15 - 37 U/L    Alk.  phosphatase 67 45 - 117 U/L    Protein, total 6.9 6.4 - 8.2 g/dL    Albumin 2.0 (L) 3.5 - 5.0 g/dL    Globulin 4.9 (H) 2.0 - 4.0 g/dL    A-G Ratio 0.4 (L) 1.1 - 2.2     NT-PRO BNP    Collection Time: 10/20/21  8:47 PM   Result Value Ref Range    NT pro-BNP >35,000 (H) <450 PG/ML   SAMPLES BEING HELD    Collection Time: 10/20/21  8:47 PM   Result Value Ref Range    SAMPLES BEING HELD SST. RED     COMMENT        Add-on orders for these samples will be processed based on acceptable specimen integrity and analyte stability, which may vary by analyte. TROPONIN-HIGH SENSITIVITY    Collection Time: 10/20/21  8:47 PM   Result Value Ref Range    Troponin-High Sensitivity 851 (HH) 0 - 51 ng/L   COVID-19 RAPID TEST    Collection Time: 10/20/21  9:15 PM   Result Value Ref Range    Specimen source Nasopharyngeal      COVID-19 rapid test Not detected NOTD     TROPONIN-HIGH SENSITIVITY    Collection Time: 10/20/21 10:31 PM   Result Value Ref Range    Troponin-High Sensitivity 916 (HH) 0 - 51 ng/L   PTT    Collection Time: 10/20/21 11:27 PM   Result Value Ref Range    aPTT 24.4 22.1 - 31.0 sec    aPTT, therapeutic range     58.0 - 77.0 SECS   CBC WITH AUTOMATED DIFF    Collection Time: 10/20/21 11:27 PM   Result Value Ref Range    WBC 11.9 (H) 3.6 - 11.0 K/uL    RBC 3.37 (L) 3.80 - 5.20 M/uL    HGB 9.9 (L) 11.5 - 16.0 g/dL    HCT 31.6 (L) 35.0 - 47.0 %    MCV 93.8 80.0 - 99.0 FL    MCH 29.4 26.0 - 34.0 PG    MCHC 31.3 30.0 - 36.5 g/dL    RDW 17.2 (H) 11.5 - 14.5 %    PLATELET 314 805 - 722 K/uL    MPV 9.9 8.9 - 12.9 FL    NRBC 0.0 0  WBC    ABSOLUTE NRBC 0.00 0.00 - 0.01 K/uL    NEUTROPHILS 78 (H) 32 - 75 %    LYMPHOCYTES 13 12 - 49 %    MONOCYTES 9 5 - 13 %    EOSINOPHILS 0 0 - 7 %    BASOPHILS 0 0 - 1 %    IMMATURE GRANULOCYTES 0 %    ABS. NEUTROPHILS 9.3 (H) 1.8 - 8.0 K/UL    ABS. LYMPHOCYTES 1.5 0.8 - 3.5 K/UL    ABS. MONOCYTES 1.1 (H) 0.0 - 1.0 K/UL    ABS. EOSINOPHILS 0.0 0.0 - 0.4 K/UL    ABS. BASOPHILS 0.0 0.0 - 0.1 K/UL    ABS. IMM.  GRANS. 0.0 K/UL    DF MANUAL      RBC COMMENTS ANISOCYTOSIS  1+       GLUCOSE, POC    Collection Time: 10/20/21 11:50 PM   Result Value Ref Range    Glucose (POC) 155 (H) 65 - 117 mg/dL    Performed by [address] [place] [address]    Collection Time: 10/21/21  3:17 AM Result Value Ref Range    Troponin-High Sensitivity 806 (HH) 0 - 51 ng/L   GLUCOSE, POC    Collection Time: 10/21/21  5:27 AM   Result Value Ref Range    Glucose (POC) 116 65 - 117 mg/dL    Performed by Ronnie Torrez    PTT    Collection Time: 10/21/21  6:18 AM   Result Value Ref Range    aPTT 50.1 (H) 22.1 - 31.0 sec    aPTT, therapeutic range     58.0 - 77.0 SECS     No results found for this visit on 10/20/21. All Micro Results     Procedure Component Value Units Date/Time    COVID-19 RAPID TEST [[id number]] Collected: 10/20/21 2115    Order Status: Completed Specimen: Nasopharyngeal Updated: 10/20/21 2139     Specimen source Nasopharyngeal        COVID-19 rapid test Not detected        Comment: Rapid Abbott ID Now       Rapid NAAT:  The specimen is NEGATIVE for SARS-CoV-2, the novel coronavirus associated with COVID-19. Negative results should be treated as presumptive and, if inconsistent with clinical signs and symptoms or necessary for patient management, should be tested with an alternative molecular assay. Negative results do not preclude SARS-CoV-2 infection and should not be used as the sole basis for patient management decisions. This test has been authorized by the FDA under an Emergency Use Authorization (EUA) for use by authorized laboratories. Fact sheet for Healthcare Providers: ConventionUpdate.co.nz  Fact sheet for Patients: ConventionUpdate.co.nz       Methodology: Isothermal Nucleic Acid Amplification                Radiology reports and films for the last 24 hours have been reviewed. Assessment/Plan:     1. NSTEMI  Elevated troponin 800, F/u  Echocardiogram.   Cont heparin drip/asa/bb/statins. Cardiology on case      2. CHF ex/acute pulmonary edema. Unknown EF. F/u  echocardiogram.  Bumex IV twice daily. Daily weight, I's/O.       3.  Acute onCKD stg  III. Watch renal function on diuretics. Avoid nephrotoxins.  Crt 2.5     4. Acquired hypothyroidism    Continue Synthroid     5. HTN  Continue home medications     6. Dementia mild. Continue supportive care      7. Difficulty swallowing. SLP eval     CODE STATUS: DNR palliative to evaluate for consideration of hospice  NOK daughter  Prophylaxis:  Hep drip      Probable Disposition:  Home w/Family.  > 48hrs,      Signed By: Maye Navarro MD     October 21, 2021

## 2021-10-21 NOTE — ED NOTES
Notified Neha Hoyt MD about slight bleeding from skin breakdown near rectum . Okay to proceed with heparin gtt.

## 2021-10-21 NOTE — PROGRESS NOTES
Advance Care Planning (ACP) Provider Conversation Snapshot      Persons included in Conversation:  patient  Length of ACP Conversation in minutes:  <16 minutes (Non-Billable)    Authorized Decision Maker (if patient is incapable of making informed decisions):    This person is:   Patient capable of making healthcare decisions          For Patients with Decision Making Capacity:   Values/Goals: Exploration of values, goals, and preferences if recovery is not expected, even with continued medical treatment in the event of:  Imminent death, severe brain injury    Conversation Outcomes / Follow-Up Plan:   Patient already has ACP but has not given us a copy, so will bring us a copy to scan into the computer Afib with RVR - on cardizem gtt  Will start amio gtt    [employer].  MD, [facility] [place]

## 2021-10-21 NOTE — PROGRESS NOTES
Admission Medication Reconciliation:     Information obtained from:   Detailed medication list.  Pharmacist visited the patient's room twice but was unable to speak with family. The patient is unable to answer questions about her home medications. RxQuery data available¹:  YES    Comments/Recommendations: The medication list notes glimepiride was increased from 2 mg daily to 4 mg daily on 10/18/21  Recommend stopping Advil PM given the patient's age. Diphenhydramine in this product increases her risk of falling. ¹RxQuery pharmacy benefit data reflects medications filled and processed through the patient's insurance, however   this data does NOT capture whether the medication was picked up or is currently being taken by the patient. Prior to Admission Medications   Prescriptions Last Dose Informant Taking? Ibuprofen-diphenhydrAMINE (Advil PM) 200-38 mg tab  Other Yes   Sig: Take 1 Tablet by mouth nightly. OTHER,NON-FORMULARY,  Other Yes   Sig: Take 1 Tablet by mouth daily. Multivitamin plus C   OTHER,NON-FORMULARY,  Other Yes   Sig: Take 1 Dose by mouth daily. Probiotic digestive health   albuterol (PROVENTIL VENTOLIN) 2.5 mg /3 mL (0.083 %) nebu  Other Yes   Si.5 mg by Nebulization route every four (4) hours as needed for Wheezing. aspirin delayed-release 81 mg tablet  Other Yes   Sig: Take 81 mg by mouth daily. budesonide (PULMICORT) 0.5 mg/2 mL nbsp  Other Yes   Si mcg by Nebulization route daily (after breakfast). budesonide (PULMICORT) 0.5 mg/2 mL nbsp  Other Yes   Si mcg by Nebulization route daily as needed (may be administered at 6 pm if the patient is having difficulty breathing). carvediloL (Coreg) 6.25 mg tablet  Other Yes   Sig: Take 6.25 mg by mouth two (2) times daily (with meals). fluticasone propionate (Flovent HFA) 220 mcg/actuation inhaler  Other Yes   Sig: Take 2 Puffs by inhalation two (2) times daily as needed (trouble breathing).    furosemide (LASIX) 40 mg tablet  Other Yes   Sig: Take 40 mg by mouth daily. glimepiride (AMARYL) 2 mg tablet  Other Yes   Sig: Take 4 mg by mouth daily. latanoprost (XALATAN) 0.005 % ophthalmic solution  Other Yes   Sig: Administer 1 Drop to both eyes Daily (before breakfast). levothyroxine (SYNTHROID) 75 mcg tablet  Other Yes   Sig: Take 75 mcg by mouth Daily (before breakfast). melatonin 5 mg tablet  Other Yes   Sig: Take 5 mg by mouth nightly. pantoprazole (Protonix) 40 mg tablet  Other Yes   Sig: Take 40 mg by mouth daily. Facility-Administered Medications: None         Please contact the main inpatient pharmacy with any questions or concerns at [phone] and we will direct you to the clinical pharmacist covering this patient's care while in-house.    Cinthia Crawley, AgD, BCPS

## 2021-10-21 NOTE — HOSPICE
Hospice Liaison Note: I had a long d/w patient's son Payton Both and his sister today about hospice and what it would look like in the home. We reviewed the different levels of hospice care and talked about our hospice house and how it can be utilized for respite stay/symptom management if necessary. Family was apprehensive about hospice because they had a previous hospice encounter with their father that was very traumatic. I reassured them that we have a 24/hr number to call if patient is in distress and a team of support and resources including the hospice house and continuous care RN's if symptoms become unmanageable in the home. After our in-depth discussion the family feels much more at ease and would like to enroll in hospice services once mom is stable for discharge. I talked with palliative NP Brenda and ED case manager Manuelito Morfin about family meeting. Please contact [id number] at [phone] to coordinate discharge and home hospice admission.

## 2021-10-21 NOTE — PROGRESS NOTES
Advance Care Planning (ACP) Provider Note - Comprehensive      Date of ACP Conversation:  10/20/21    Persons included in Conversation:  patient   Length of ACP Conversation in minutes:  16 minutes     Authorized Decision Maker (if patient is incapable of making informed decisions): This person is: Mr. [facility] for ALL Patients with Decision Making Capacity:  Importance of advance care planning, including choosing a healthcare agent to communicate patient's healthcare decisions if patient lost the ability to make decisions. Review of Existing Advance Directive:  Patient and family do not have an advance directive readily available for review. Active Diagnoses:   CHF exacerbation    These active diagnoses are of sufficient risk that focused discussion on advance care planning is indicated in order to allow the patient to thoughtfully consider personal goals of care; and, if situations arise that prevent the ability to personally give input, to ensure appropriate representation of their personal desires for different levels and aggressiveness of care. For Serious or Chronic Illness:  Understanding of medical condition     Reviewed pt's clinical status. Stef Dahl has multiple medical problems including DM, CHF, hypertension, hypothyroidism  and is being admitted for NSTEMI. We reviewed our treatment plan and anticipated discharge plans. Further, we discussed pt's wishes on how she would like to proceed (aggressive/heroic resuscitation vs not intervening and allowing nature takes its course) if she were to suffer cardiopulmonary arrest.    Understanding of CPR, goals and expected outcomes, benefits and burdens discussed. Information on CPR success provided (many factors lower a patients chance of survival, including advanced age, performance status, malignancy, and presence of multiple comorbidities);  Individual asked to communicate understanding of information in his/her own words.     CPR works best to restart the heart when there is a sudden event, like a heart attack, in someone who is otherwise healthy. Unfortunately, CPR does not typically restart the heart for people who have serious health conditions or who are very sick. \"     \"In the event your heart stopped as a result of an underlying serious health condition, would you want attempts to be made to restart your heart (answer \"yes\" for attempt to resuscitate) or would you prefer a natural death (answer \"no\" for do not attempt to resuscitate)? \" NO    Patient made it very clear to me that she would not want heroic measures for resuscitation in the event of cardiopulmonary arrest, including chest compressions, defibrillation, intubation/mechanical ventilation.        Interventions Provided:  Referral made for ACP follow-up assistance to: Palliative care

## 2021-10-21 NOTE — PROGRESS NOTES
CARE MANAGEMENT INITIAL ASSESSEMENT      NAME:   Tama Rinne   :     10/9/1930   MRN:     [id number]       Emergency Contact:  Extended Emergency Contact Information  Primary Emergency Contact: [address]  Mobile Phone: [phone]  Relation: Child  Secondary Emergency Contact: Klever Lester  Relation: Daughter    Advance Directive:  DNR, has an advance directive. Copy on chart. .    Healthcare Decision Maker:    Primary Decision Maker (Active): Klever Lester - Daughter    Secondary Decision Maker: Shmuel Grissom Child - [phone]     Reason for Admission:  Ms. Chary Moore is a 80 y.o. female with history that includes dementia, HTN and hypothyroidism  who was emergently admitted for:  NSTEMI    Patient Active Problem List   Diagnosis Code    Acute on chronic renal failure (HCC) N17.9, N18.9    Leukocytosis D72.829    Acquired hypothyroidism E03.9    HTN (hypertension) I10    Acute diverticulitis K57.92    GI bleed K92.2    Elevated troponin R77.8    Elevated brain natriuretic peptide (BNP) level R79.89    Dementia (HCC) F03.90    NSTEMI (non-ST elevated myocardial infarction) ([facility] [address] I21.4       Assessment: In person with patient and daughter Faustina Becker ([phone]). RUR:  14%  Risk Level:  Low  Value-based purchasing:   No  Bundle patient:  No    Residency:  Private residence  Exterior Steps:  Ramp  Interior Steps:  14. Pt's bedroom is upstairs. Pt uses a stair lift. Lives With:  Other: Pt has 24 hour caregivers    Prior functioning:  Dependent.   Patient requires assistance with:  Bathing, Dressing, Transferring, Ambulation and Toileting    Prior DME required:  Rolling walker, Shower chair, Raised toilet seat, Hospital bed and Other:  transport chair    DME available:  Rolling walker, Shower chair, Raised toilet seat, Hospital bed and Other:  transport chair    Rehab history:  Home Health:  Provider - [facility]  Date - 2021    Discharge Concerns:  None      Insurer:  Payor: Ijeomagenevieve Longoria / Plan: [employer] MEDICARE CHOICE PPO/PFFS / Product Type: Managed Care Medicare /     PCP: Ozzie Wilson MD   Name of Practice:  [facility]   Current patient: Yes   Approximate date of last visit: last Friday   Access to virtual PCP visits:   No    Pharmacy:  [facility]. Pt denies any problems obtaining/taking medications. COVID-19 vaccination status:  Fully vaccinated in May    DC Transport:  Family      Transition of care plan:  Home with hospice: [facility]     Comments:   CM met with Pt and daughter, Stefania Diaz, to complete initial assessment. Pt lives at home with 24 hour caregivers (caregivers are not from an agency). Pt has hx of HH through [facility], however, Pt is not currently open. Pt has no hx of home O2. Pt has a walker, transport chair, shower chair, raised toilet seat, and hospital bed at home. Pt ambulates with a walker. Pt is dependent with all ADLs. Daughter states that Pt is unsteady with a walker. Daughter denies recent falls. Family/caregiver monitor closely when Pt walks. Stefania Diaz states that family might be interested in hospice upon discharge but is unsure due to traumatic hospice incident with her father (Pt's spouse). CM offered hospice info session to alleviate fears. Stefania Diaz agreeable. Stefania Diaz would like to use [facility]. CM tried to make referral, however, [facility] is not accepting referrals due to staffing. Stefania Diaz agreeable to meeting with [employer] [employer]. CM made referral via [facility]. CM spoke with Antonia (hospice liaison) re: referral. Antonia coordinated with CM after meeting stating that Pt would like family to go home with [facility] upon discharge. CM will continue to follow. _____________________________________  Render OSMAN Condon - Care Management  10/21/2021   4:34 PM      Care Management Interventions  PCP Verified by CM:  Yes Maulik Lerma MD)  Mode of Transport at Discharge:  Other (see comment) (family)  Transition of Care Consult (CM Consult): Discharge Planning  MyChart Signup: No  Discharge Durable Medical Equipment: No  Physical Therapy Consult: Yes  Occupational Therapy Consult: Yes  Speech Therapy Consult: No  Support Systems: Child(aurea)  Confirm Follow Up Transport: Family  Discharge Location  Discharge Placement: Home with hospice

## 2021-10-21 NOTE — CONSULTS
Katelyn Trevino MD., [facility]    [address] [facility] [place], [address] [facility]    Office [phone],[facility] [phone]           10/21/2021     Admit Date: 10/20/2021      Margaret Pearce is a 80 y.o. female admitted for Elevated troponin [R77.8]. Consult requested by Eliana Blankenship MD       Assessment/Plan:    Acute CHF exacerbation/hypoxia  Elevated high-sensitivity troponin-flat; probable non-ACS troponin elevation secondary to myocardial strain-type II MI  FRED on CKD  History of hypertension  Stable hypothyroidism  Diabetes mellitus  Dementia    Plan:  On Bumex 1 mg IV twice daily-continue  Continue labetalol/Norvasc  Nitrates  On heparin GTT  Nephrology consult  Palliative care consult-goals of care-? [facility] once renal function improves if family wishes to proceed vs medical management  Records from outside hospital-recent admission     Add: Patient went into A. fib with RVR. Will start Cardizem gtt. Add digoxin if needed. Discussed patient's condition/prognosis with daughter Brenden SCOTT and son Milly Grullon. Preliminary EF 20 to 25%. Poor prognosis. Discussed about invasive procedures given her underlying dementia and comorbidities. Would recommend medical management but patient's family will discuss among themselves and let us know. Palliative care consulted    Patient has seen Dr. Loletha Crigler. Will sign out to [place] to continue care    Please do not hesitate to contact us with questions or concerns. See note below for details. Katelyn Trevino MD      Cardiac Testing/Procedures: A. Cardiac Cath/PCI:    B.ECHO/MITCHEL:    C.StressNuclear/Stress ECHO/Stress test:    D.Vascular:    E. EP:    F. Miscellaneous:    History:     Patient  is a 80 y.o. female admitted for NSTEMI. History from chart. History of dementia-patient unable brought history. History of intermittent chest pain for the past 2 days. Dyspnea present. No cough/fever.   Patient admitted to [facility] in September for COPD exacerbation. History of dementia, diabetes mellitus, CHF, hypertension, hypothyroidism, COPD. Initially code STEMI activated but on review of EKG-LVH with ST-T changes-code STEMI canceled. High-sensitivity troponin [phone]  NTproBNP >43332  Cr 2.5  Hb 9.9    Cxray - Interstitial process compatible with interstitial edema, interstitial pneumonia,  or an interstitial inflammatory process. Clinical correlation needed. PMH/PSH/FH/Soc Hx:     Past Medical History:   Diagnosis Date    Dementia ([facility] [address]     Hypertension     Hypothyroid     Ill-defined condition     chronic back pain      Past Surgical History:   Procedure Laterality Date    HX  SECTION       No Known Allergies  History reviewed. No pertinent family history.    Social History     Tobacco Use    Smoking status: Never Smoker   Substance Use Topics    Alcohol use: No    Drug use: No           Medications:       Current Facility-Administered Medications   Medication Dose Route Frequency    sodium chloride (NS) flush 5-40 mL  5-40 mL IntraVENous Q8H    sodium chloride (NS) flush 5-40 mL  5-40 mL IntraVENous PRN    acetaminophen (TYLENOL) tablet 650 mg  650 mg Oral Q6H PRN    Or    acetaminophen (TYLENOL) suppository 650 mg  650 mg Rectal Q6H PRN    polyethylene glycol (MIRALAX) packet 17 g  17 g Oral DAILY PRN    ondansetron (ZOFRAN ODT) tablet 4 mg  4 mg Oral Q8H PRN    Or    ondansetron (ZOFRAN) injection 4 mg  4 mg IntraVENous Q6H PRN    bumetanide (BUMEX) injection 1 mg  1 mg IntraVENous BID    insulin lispro (HUMALOG) injection   SubCUTAneous Q6H    glucose chewable tablet 16 g  4 Tablet Oral PRN    dextrose (D50W) injection syrg 12.5-25 g  12.5-25 g IntraVENous PRN    glucagon (GLUCAGEN) injection 1 mg  1 mg IntraMUSCular PRN    heparin 25,000 units in D5W 250 ml infusion  12-25 Units/kg/hr IntraVENous TITRATE     Current Outpatient Medications   Medication Sig    labetalol (NORMODYNE) 100 mg tablet Take 1 Tab by mouth every twelve (12) hours.  L. acidoph & paracasei- S therm- Bifido (RYAN-Q) 8 billion cell cap cap Take 1 Cap by mouth daily.  oxyCODONE-acetaminophen (PERCOCET) 5-325 mg per tablet Take 1 Tab by mouth every six (6) hours as needed for Pain. Max Daily Amount: 4 Tabs.  senna-docusate (SENNA WITH DOCUSATE SODIUM) 8.6-50 mg per tablet Take 1 Tab by mouth daily.  ondansetron (ZOFRAN ODT) 4 mg disintegrating tablet Take 1 Tab by mouth every eight (8) hours as needed for Nausea.  levothyroxine (SYNTHROID) 25 mcg tablet Take 25 mcg by mouth Daily (before breakfast).  cholecalciferol, vitamin D3, 50,000 unit tab Take 1 Tab by mouth every fourteen (14) days.  timolol (TIMOPTIC) 0.5 % ophthalmic solution Administer 1 Drop to both eyes nightly.  cholecalciferol (VITAMIN D3) 1,000 unit tablet Take 1,000 Units by mouth every other day.  co-enzyme Q-10 (CO Q-10) 100 mg capsule Take 100 mg by mouth daily.  red yeast rice extract 600 mg cap Take 600 mg by mouth daily.  amLODIPine (NORVASC) 10 mg tablet Take 10 mg by mouth daily.  traMADol (ULTRAM) 50 mg tablet Take 50 mg by mouth two (2) times a day. 50-100mg       Review of Systems:     As in HPI - all other  10 point ROS negative    Physical Exam:       Visit Vitals  BP (!) 174/74 (BP 1 Location: Left upper arm, BP Patient Position: Lying)   Pulse 94   Temp 98.5 °F (36.9 °C)   Resp 26   Ht 5' (1.524 m)   Wt 190 lb 4.1 oz (86.3 kg)   SpO2 96%   BMI 37.16 kg/m²         Telemetry: normal sinus rhythm    Gen: Well-developed, well-nourished,elderly, on O2  Neck: Supple,No JVD, No Carotid Bruit,   Resp: No accessory muscle use, Dec AE bilat; Bilat rales+  Card: Regular Rate,Rythm,Normal S1, S2, 2/6 sys murmur+,No rubs or gallop. No thrills.    Abd:  Soft,BS+,   MSK: No cyanosis  Skin: No rashes    Neuro: moving all four extremities , follows commands appropriately  Psych:  alert,  LE: 2+ edema    EKG: SR; LVH with STT changes        Cxray: Reviewed     LABS: Reviewed      Care Plan discussed with: Nursing Staff      Total time:      mins     Brandi Doe MD

## 2021-10-21 NOTE — WOUND CARE
Wound Consult:  New Patient Visit. Chart reviewed. Consulted for bleeding area in posterior perineal area. Spoke with patients nurses and in with daughter and fellow wound nurse Janny to assess. Patient is resting on a Stretcher in ED. Patient is alert, pleasant and cooperative; requires one to two person assist to move side to side in bed. David score 17; Pure wick in use. Assessment:  Posterior perineal area/labia - small area where when wiped with cloth note small amount sanguinous drainage on wipe; coming from tiny skin tear in perineal area; pink skin within area and through perineal area, blanching. Daughter reports mom uses pad inside depend and does not like to have to get up often at home to go to bathroom so she notes she ends of sitting longer in moist environment. No redness to sacrum/inner buttocks or heals. Treatment:  Used Z guard ointment to area and surrounding skin to protect. Skin Care / PI Prevention Recommendations:  1. Minimize friction/shear: minimize layers of linen/pads under patient. 2. Off load pressure/reposition: turn and reposition approximately every 2 hours; float heels with pillows or use off loading heel boots; waffle cushion for sitting. 3. Manage Moisture - keep skin folds dry; incontinence skin care with incontinence wipes; gentle cleansing; use barrier ointment, Pure wick in use. 4. Continue to monitor nutrition, pain, and skin risk scale, and skin assessment. Plan: We will continue to reassess weekly and as needed. Please re-consult should concerns arise despite continued skin/PI prevention measures.     Sallee Spurling, MSN, RN, [address] / [address] Office [phone]

## 2021-10-22 PROBLEM — R79.89 ELEVATED BRAIN NATRIURETIC PEPTIDE (BNP) LEVEL: Status: RESOLVED | Noted: 2021-01-01 | Resolved: 2021-01-01

## 2021-10-22 PROBLEM — R77.8 ELEVATED TROPONIN: Status: RESOLVED | Noted: 2021-01-01 | Resolved: 2021-01-01

## 2021-10-22 PROBLEM — I21.4 NSTEMI (NON-ST ELEVATED MYOCARDIAL INFARCTION) (HCC): Status: RESOLVED | Noted: 2021-01-01 | Resolved: 2021-01-01

## 2021-10-22 PROBLEM — I50.84 END STAGE HEART FAILURE (HCC): Status: ACTIVE | Noted: 2021-01-01

## 2021-10-22 NOTE — PROGRESS NOTES
CARDIOLOGY PROGRESS NOTE    Jorje Barrera MD,  [address], [place], [address] [facility]  Phone [phone]; Fax [phone]  Mobile [phone]   Voice Mail [phone]        10/23/2021 6:14 PM       Admit Date:           10/22/2021  Admit Diagnosis:  End stage heart failure ([facility] [address] [I50.84]  :          10/9/1930   MRN:          [id number]      Primary care: Ayaan Edwards MD       ATTENTION:   This medical record was transcribed using an electronic medical records/speech recognition system. Although proofread, it may and can contain electronic, spelling and other errors. Corrections may be executed at a later time. Please feel free to contact us for any clarifications as needed. Impression Plan/Recommendation   1. CM/Severe and new  2. Bradycardia  3. PAF  4. Aortic stenosis(mod)  5. Renal insufficiency              Albumin 2, Creatinine 2.37,  1. Family at this point is chosen to go forward with hospice and is not interested in Eliquis. No further cardiac involvement is necessary here. 2.  Would discontinue her cardiac monitor     We discussed the expected course, resolution and complications of the diagnosis(es) in detail. Medication risks, benefits, costs, interactions, and alternatives were discussed as indicated. Cardiomyopathy    No intake/output data recorded. Last 3 Recorded Weights in this Encounter    10/22/21 1546   Weight: 190 lb 4.1 oz (86.3 kg)         10/21 1901 - 10/23 0700  In: 0   Out: 200 [Urine:200]    SUBJECTIVE               Tika Schwab reports No complaints but appears short of breath and family is holding her hand is with her. .      Current Facility-Administered Medications   Medication Dose Route Frequency    ondansetron (ZOFRAN) injection 4 mg  4 mg IntraVENous Q4H PRN    haloperidol lactate (HALDOL) injection 2 mg  2 mg IntraVENous Q1H PRN    acetaminophen (TYLENOL) suppository 650 mg  650 mg Rectal Q4H PRN    glycopyrrolate (ROBINUL) injection 0.2 mg  0.2 mg IntraVENous Q4H PRN    bisacodyL (DULCOLAX) suppository 10 mg  10 mg Rectal DAILY PRN    HYDROmorphone (DILAUDID) syringe 0.5 mg  0.5 mg IntraVENous Q15MIN PRN    haloperidol lactate (HALDOL) injection 2 mg  2 mg IntraVENous Q4H    saline peripheral flush soln 5 mL  5 mL InterCATHeter PRN      OBJECTIVE               Intake/Output Summary (Last 24 hours) at 10/23/2021 1318  Last data filed at 10/23/2021 0300  Gross per 24 hour   Intake 0 ml   Output 200 ml   Net -200 ml       Review of Systems - History obtained from the patient AS PER  HPI        PHYSICAL EXAM        Visit Vitals  BP (!) 147/69 (BP 1 Location: Right arm, BP Patient Position: At rest)   Pulse 100   Temp 98.2 °F (36.8 °C)   Resp 22   Ht 5' (1.524 m)   Wt 190 lb 4.1 oz (86.3 kg)   SpO2 90%   BMI 37.16 kg/m²       Gen: Well-developed, well-nourished,  ill-appearing and short of breath  HEENT:  Pink conjunctivae, Hearing grossly normal.No scleral icterus or conjunctival, moist mucous membranes  Neck: Supple,No JVD, No Carotid Bruit, Thyroid- non tender No cervical lymphadenopathy  Resp: Clear breath sounds anteriorly but poor effort  Card: Tachycardic  MSK: No cyanosis or clubbing, good capillary refill  Skin: No rashes or ulcers, no bruising         DATA REVIEW      No specialty comments available. Laboratory and Imaging have been reviewed by me and are notable for  No results for input(s): CPK, CKMB, TROIQ in the last 72 hours.   Recent Labs     10/22/21  0538 10/20/21  2327 10/20/21  2047     --  145   K 3.2*  --  3.5   CO2 31  --  28   BUN 32*  --  32*   CREA 2.37*  --  2.51*   *  --  159*   MG 2.1  --   --    WBC 9.7 11.9* 12.6*   HGB 9.1* 9.9* 10.0*   HCT 29.9* 31.6* 32.5*    361 [id number]             Leonard Marti MD

## 2021-10-22 NOTE — PROGRESS NOTES
Abbi Kim MD, [address] [place] [address]  [place], [facility] [address]  [phone]      IMPRESSION and RECOMMENDATIONS     1. Cardiomyopathy:  New severe LV dysfunction. Etiology unclear. Poss tachycardia-induced due to occult #2. No ACEI/ARB due to renal dysfunction. No BB due to tendency to bradycardia. Minimal hypertroponinemia: no plans for ischemia eval.    2.  AF:  Suspect occult paroxysms. Will try to maintain NSR with amio. Change to po. Discussed anticoagulation with daughter. Will stop heparin. Consider eliquis 2.5mg bid despite hospice given CVA may result in worsening abilities to conservatively aid her. 3.  AS:  Likely moderate. I have discussed this plan with the patient and daughter. Subjective:       Arousable but non-communicative at this time.         Objective:   Patient Vitals for the past 16 hrs:   BP Temp Pulse Resp SpO2   10/22/21 0835  97.6 °F (36.4 °C)      10/22/21 0732     97 %   10/22/21 0600 (!) 140/54  63 21 98 %   10/22/21 0545 (!) 137/52  60 20 98 %   10/22/21 0530 (!) 131/57  (!) 59 23 98 %   10/22/21 0515 (!) 146/53  (!) 59 23 98 %   10/22/21 0500 (!) 124/55  (!) 54 26 99 %   10/22/21 0445 (!) 144/51  (!) 57 21 97 %   10/22/21 0430   62 24 95 %   10/22/21 0415 137/89  (!) 58 26 98 %   10/22/21 0400 (!) 132/53  (!) 54 25 97 %   10/22/21 0345 111/60  (!) 53 25 96 %   10/22/21 0330 (!) 127/54  82 24 96 %   10/22/21 0315 (!) 146/58 98.2 °F (36.8 °C) 86 24 94 %   10/22/21 0300 128/74  92 28 96 %   10/22/21 0245 127/74  96 24 97 %   10/22/21 0230   99 24 96 %   10/22/21 0215 121/88  90 25 96 %   10/22/21 0200 (!) 136/100  84 23 96 %   10/22/21 0145   83 27 97 %   10/22/21 0130 117/85  99 25 96 %   10/22/21 0115 122/86  92 23 97 %   10/22/21 0100 106/63  94 26 95 %   10/22/21 0045 123/60  100 27 96 %   10/22/21 0030 107/82  95 22 97 %   10/22/21 0015 103/66  93 28 96 %   10/22/21 0000 132/87  93 27 95 % 10/21/21 2345 113/86  (!) 110 24    10/21/21 2330 120/69  93 28 95 %   10/21/21 2315 132/87  94 25 97 %   10/21/21 2311 132/87 98.4 °F (36.9 °C) 98 18    10/21/21 2300 124/73  (!) 106 29 97 %   10/21/21 2245 115/67  (!) 113 19 97 %   10/21/21 2230 (!) 131/97  99  96 %   10/21/21 2215 120/81    (!) 87 %   10/21/21 2200 116/86  (!) 118 (!) 31 95 %   10/21/21 2145 (!) 139/15  (!) 123 (!) 35 96 %   10/21/21 2130 (!) 103/58  (!) 124  91 %   10/21/21 2115 (!) 106/56  (!) 123 28 94 %   10/21/21 2100 (!) 109/52  (!) 119  95 %   10/21/21 2045   (!) 137 26 94 %   10/21/21 2030   (!) 120 24 95 %   10/21/21 2015   (!) 124 21 97 %   10/21/21 2010  98.3 °F (36.8 °C)   94 %   10/21/21 2000   (!) 126 18 99 %   10/21/21 1815 135/64  (!) 134 22 (!) 86 %   10/21/21 1800   (!) 126 25 93 %   10/21/21 1745 (!) 145/103  (!) 124 27 93 %       HEENT Exam:     WNL         Lung Exam:     The patient is dyspneic. Breath sounds are heard equally in all lung fields. There are no wheezes, rales, rhonchi, or rubs heard on auscultation. Heart Exam:     The rhythm is regular. The PMI is in the 5th intercostal space of the MCL. Apical impulse is normal. S1 is regular. S2 is physiologic. There is no S3, S4 gallop, click, or rub. 3/6 decrescendo SM. Abdomen Exam:     Benign. Extremities Exam:     No cyanosis, clubbing, edema. Distal pulses intact.            Lab Results   Component Value Date/Time    Glucose 117 (H) 10/22/2021 05:38 AM    Sodium 143 10/22/2021 05:38 AM    Potassium 3.2 (L) 10/22/2021 05:38 AM    Chloride 106 10/22/2021 05:38 AM    CO2 31 10/22/2021 05:38 AM    BUN 32 (H) 10/22/2021 05:38 AM    Creatinine 2.37 (H) 10/22/2021 05:38 AM    Calcium 8.2 (L) 10/22/2021 05:38 AM     Recent Labs     10/22/21  0538 10/20/21  2327   WBC 9.7 11.9*   HGB 9.1* 9.9*   HCT 29.9* 31.6*    361     Recent Labs     10/20/21  2047   ALT 33   AP 67   TBILI 0.4   TP 6.9   ALB 2.0*   GLOB 4.9*     Recent Labs     10/22/21  0636 10/22/21  0538   APTT >130.0* 121.0*      No results for input(s): CPK, CKMB, TNIPOC in the last 72 hours. No lab exists for component: TROPONINI, ITNL  No results for input(s): TROIQ in the last 72 hours.   No results found for: CHOL, CHOLX, CHLST, CHOLV, HDL, HDLP, LDL, LDLC, DLDLP, TGLX, TRIGL, TRIGP, CHHD, CHHDX

## 2021-10-22 NOTE — PROGRESS NOTES
Patient converted to sinus bradycardia in 50's at 0331, so stopped amiodarone and cardizem infusion per order in [facility] to discontinue drips if HR <60. Notified Dr. Denys John on call for hospitalists. Patient remains in [place] REGAN Ortiz 50's at this current time. /55. Will continue to monitor.

## 2021-10-22 NOTE — PROGRESS NOTES
Physical Therapy  Noted patient family has chosen hospice. .  We will wait to complete the order due to additional family meeting planned, but it patient is going hopsice we will complete the order. Thank you.   Chase Aguayo PT,DPT,NCS

## 2021-10-22 NOTE — PROGRESS NOTES
Neuro: alert to self only. Confused. Follows command, moves all extremities. OOb to bathroom using walker in room  Cardiac:SB to NSR, HR in 50-90s. Scheduled metoprolol held per cardiologist. Elevated BP, prn Hydralazine given  Respiratory: on 2L  On NC. O2 >95%  GI: dysphagia diet, tolerating meals.  1 bm during shift  : adequate using external cath  Progress Notes:   daughter at bedside  Afebrile   Will continue to monitor

## 2021-10-22 NOTE — PROGRESS NOTES
Dr. Alisha Tuttle last office note:    Juan Lawrence 10/09/1930   Office/Outpatient Visit  Visit Date: Fri, Sep 24, 2021 4:00 pm  Provider: Alexandru Landa MD (Assistant: Louis Crabtree, [employer] )  Location: Cardiology of [facility] AT [facility] ([facility])[address] [place]. [address] [phone]    Electronically signed by Mariam Orozco MD on  09/24/2021 04:17:28 PM                           Subjective:    CC: Mrs. Monster Rojas is a 80year old White female. Her primary care physician is Mitzy Mukherjee MD.  This is a 1 week follow-up visit. She presents today with a complaint of chest pain, fatigue, and shortness of breath. Since her last visit, she has had the following testing: echocardiogram (Echocardiogram) and chest x-ray. Patient verbalized medications unchanged since last office visit. She has a history of carotid artery stenosis with cerebral infarction,  acute on chronic diastolic heart failure, hypercholesterolemia,  essential hypertension, and mild severity aortic stenosis. HPI:       Cerebral infarction due to embolism of unspecified carotid artery noted. Prior work-up has included Carotid 12/12/2014. Dx with malaise and fatigue; patient describes fatigue severe for the last 3 years or so. This has been a constant problem. She has problems initiating sleep, noting that it sometimes takes over 1 hour to fall asleep. It is difficult to maintain sleep, and she often wakes up after only 4-5 hours. Regarding shortness of breath: This has been noted for the past several years. Its course has been worsening. Associated symptoms include cough, hoarseness, wheezing and vomiting. She denies fever. This tends to be worse with exertion (even minimal). Refused swallowing study. Chest X-Ray 9/22/21 showed no pheumonia or CHF. She lost 10 pounds with diuresis but is still short of breath.             Regarding hypertension:  Type Primary Hypertension Current level of activity includes ability to walk with a walker. Current symptoms include chest pain, shortness of breath, cough and lower extremity edema. Currently, her treatment regimen consists of daily aspirin,  Coreg, and a diuretic ( furosemide ). Regarding chest pain:   The discomfort is located primarily in the right parasternal.  It radiates to the shoulders. She characterizes the pain as pressure. Heart Valves: Aortic Stenosis: Aortic stenosis is mild. To evaluate for aortic stenosis, the patient has had a prior echocardiogram ( performed 9/24/2021; official interpretation shows Normal LV systolic function with an estimated ejection fraction of 60%. There is severe concentric left ventricular hypertrophy. The left atrium is mildly enlarged. There is trace mitral regurgitation. There is trace tricuspid regurgitation. There is trace pulmonary regurgitation. Mild aortic stenosis with a calculated aortic valve area of 0.9 cm2 with a peak gradient of 44 mmHg and a mean gradient of 24 mmHg. ). Regarding Heart Failure: Specifically this is diastolic heart failure acute on chronic. Current symptoms include dyspnea and edema. Past Medical History / Family History / Social History:     Last Reviewed on 9/24/2021 03:56 PM by Leno Torres  Past Medical History:     Carotid Artery Stenosis: dx'd in 12/2014;   Hypercholesterolemia  Hypertension  Aortic Stenosis: moderate severity;   Gout   Cerebrovascular Accident: x 1; in March 2016;   Dementia: Possible stroke related; Shingles   Skin cancer  Bladder cancer   Glaucoma: dx'd in 2014;   Chronic UTI   INFLUENZA VACCINE: was last done 2020   COVID-19 Vaccine: J&J-05/2021   PNEUMOCOCCAL VACCINE: 10/2016     Past Cardiac Procedures:  Echocardiogram:  6/14/18 - Normal LV systolic function with an estimated ejection fraction of 60%. There is moderate left ventricular hypertrophy. There is mild tricuspid regurgitation.     Moderate aortic stenosis with a calculated aortic valve area of 1.2 cm2 with a peak gradient of 38 mmHg and a mean gradient of 26 mmHg. Echo 5/11/17 - Normal LV systolic function with an estimated ejection fraction of 60%. There is mild to moderate left ventricular hypertrophy. There is trace aortic regurgitation. Mild aortic stenosis with a calculated aortic valve area of 1.2 cm2 with a peak gradient of 19 mmHg and a mean gradient of 12 mmHg. Echocardiogram 12/12/14 - LVEF 65%. Mild AR. Mild AS with a calculated aortic valve area of 1.3 cm2 with a peak gradient of 23 mmHg and a mean gradient of 12 mmHg. Nuclear Study:  Dobutamine Cardiolite study 3/21/13 - Normal myocardial perfusion Sestamibi SPECT imaging. Calculated left ventricular ejection fraction was normal at >70%. Carotid Study:  12/12/14 - Moderate plaques involving the carotid system. <50% stenosis of the right internal carotid artery. <50% stenosis of the left internal carotid artery. Antegrade vertebral artery flow bilaterally. Treadmill Stress Test:  10/04  EF of 64%. Carotid Doppler 5/11/17 -  Mild plaques involving the carotid system. 50 - 69% stenosis of the right internal carotid artery. <50% stenosis of the left internal carotid artery. Antegrade vertebral artery flow bilaterally. Compared to the previous study from 12-12-14, there is progression of carotid atherosclerosis. Echo 3/15/19 - Normal LV systolic function with an estimated ejection fraction of 66%. There is mild left ventricular hypertrophy. The left atrium is mildly enlarged. The right atrium is mildly enlarged. There is mild aortic regurgitation. There is mild mitral regurgitation. There is mild tricuspid regurgitation. There is mild pulmonary regurgitation. Mild aortic stenosis with a calculated aortic valve area of 1.2 cm2 with a peak gradient of 28 mmHg and a mean gradient of 17 mmHg. Surgical History:     Positive for  Bladder cancer surgery. ; and  Bilateral breast lumpectomy.;;     Family History:   Father:  at age 64  ; [de-identified] for Accident; Mother:  at age 80  ; Positive for Aneurysm in neck; Social History:   Social History:   Occupation: Retired (Prior occupation:  and real estate)   Marital Status:    Children: 3 children and one of the 3  as a baby     Tobacco/Alcohol/Supplements:   Last Reviewed on 2021 03:56 PM by Pablo FUNES  TOBACCO/ALCOHOL/SUPPLEMENTS   Tobacco: She has never smoked. Alcohol: Non-drinker   Caffeine:  She admits to consuming caffeine via coffee ( 1 serving per day ). Substance Abuse History:   Last Reviewed on 2021 03:56 PM by Pablo FUNES  Substance Use/Abuse:   None     Mental Health History:   Last Reviewed on 2021 03:56 PM by Roshni Morel (eg STDs): Last Reviewed on 2021 03:56 PM by Pablo FUNES    Current Problems:   Last Reviewed on 2021 03:56 PM by Molly Lewis  Hypercholesterolemia  Pure hypercholesterolemia  Essential (primary) hypertension  Essential hypertension, benign  Cardiac murmur  Aortic stenosis  Carotid artery stenosis, with cerebral infarction  Cerebral infarction due to embolism of unspecified carotid artery  Cerebral infarction due to unspecified occlusion or stenosis of unspecified carotid arteries  Aortic valve disorder  Shortness of breath  Nonrheumatic aortic (valve) stenosis  Fatigue  Shortness of Breath  Other chest pain  Chest pain, unspecified  Malaise and fatigue    Allergies:   Last Reviewed on 2021 03:56 PM by Pablo FUNES  No Known Allergies.     Current Medications:   Last Reviewed on 2021 03:56 PM by Pablo FUNES  pantoprazole 40 mg oral tablet, delayed release (enteric coated) [take 1 tablet (40 mg) daily]  levothyroxine 75 mcg oral tablet [take 1 tablet (75 mcg) by oral route once daily]  melatonin 5 mg oral capsule [PRN]  aspirin 81 mg oral tablet, delayed release (enteric coated) [Take 1 tablet(s) by mouth qam]  Timolol eye drops as directed daily   furosemide 40 mg oral tablet [take 1 tablet (40 mg) by oral route 2 times per day]  Tessalon Perle 100 mg 5 po prn cough   Stiolto Respimat 2.5-2.5 mcg/actuation Inhalation Mist  Multivitamins   Carvedilol 6.25 mg oral tablet [1 po bid]  Benzonatate     Objective:    Vitals:     Historical:   9/21/2021  BP:   134/95 mm Hg ( (left arm, , sitting, );) 9/21/2021  Wt:   195lbs  Current: 9/24/2021 3:55:49 PM  Ht:  4 ft, 10 in; Wt: 185 lbs;  BMI: 38. 7BP: 127/85 mm Hg (left arm, sitting);  P: 81 bpm (left arm (BP Cuff), sitting);  sCr: 1.44 mg/dL;  GFR: 26.73    Repeat:   3:56:7 PM  BP:   163/81mm Hg (left arm, standing)   Exams:   GENERAL:  Alert, oriented to person, place and time. HEENT:  Pinkish palpebral  conjunctivae. Anicteric sclerae. Neck: Bilateral carotid bruit   Chest: Rhonchi   Heart: Reg rate and rhythm. Grade 2/6 systolic ejection murmur at the aortic area radiating to the neck radiating to the precordium. ABDOMEN:  Soft. Normal active bowel sounds. No tenderness. Extremities: 2+ pitting pedal edema.       Lab/Test Results:     Sodium, Serum: 144 (04/24/2019),   Potassium, Serum: 4.6 (04/24/2019),   Glucose Serum: 168 (04/24/2019),   BUN serum/plasma ([facility]): 28 (04/24/2019),   Creatinine, Serum: 1.44 (04/24/2019),   eGFR: 32 (04/24/2019),       Assessment:     I63.139   Cerebral infarction due to embolism of unspecified carotid artery     R53   Malaise and fatigue     R06.02   Shortness of breath     E78.0   Pure hypercholesterolemia     I10   Essential (primary) hypertension     I35.0   Nonrheumatic aortic (valve) stenosis     R07.9   Chest pain, unspecified     R07.89   Other chest pain     I35.0   Nonrheumatic aortic (valve) stenosis     I35.0   Nonrheumatic aortic (valve) stenosis     I10   Essential (primary) hypertension     I50.33   Acute on chronic diastolic (congestive) heart failure R05.7   Diastolic (congestive) heart failure     I50.33   Acute on chronic diastolic (congestive) heart failure       ORDERS:     Procedures Ordered:     RFPULM  Pulmonologist Referral  (Send-Out)              Plan:     Cerebral infarction due to embolism of unspecified carotid artery    Advised patient to go directly to [facility] ER     The above note was transcribed by Cande English and authenticated by Dr. Gwen Cardenas prior to sign off.

## 2021-10-22 NOTE — CONSULTS
Consult dict    FRED due to Rapid Afib, cardiorenal syndrome, improving  CKD 3, not sure of exact baseline    Rec:  No need for RRT  Continue supportive care  Optimize cardiac management  Monitor serial labs  Avoid nephrotoxins

## 2021-10-22 NOTE — PROGRESS NOTES
Last office echo:    Mark Weller  10/09/1930   Office/Outpatient Visit  Visit Date: Fri, Sep 24, 2021 03:52 pm  Provider: Zakiya Stringer MD (Assistant: Naima Hudson, )  Location: Cardiology of [facility] AT [facility])[address] [place]. [address] [phone]    Electronically signed by Marifer Payne MD on  09/24/2021 04:05:31 PM                           Subjective:    CC: Mrs. Dorinda Ayers is a 80year old White female. Her primary care physician is Alyssa al M.D. .  She is here for the following outpatient procedure: ECHOCARDIOGRAM for evaluation of aortic stenosis and chest pain, fatigue, and shortness of breath     Allergies:   Last Reviewed on 9/22/2021 04:01 PM by Debbie Ramirez  No Known Allergies. Objective:    Lab/Test Results:     Sonographer: Derrick Matta RDCS   Height: 4 ft. 10 in. Weight: 195 lbs   BSA: 1.7 /m2   Blood Pressure:  134/95   LVID (ed) 3.5-5.5 cm(2.1-3.2 cm/m2): PATIENT DATA: 4.3  cm   LVID (es) 2.5-4.0 cm. PATIENT DATA: 1.8 cm   IVS (ed) 0.7-1.2 cm: PATIENT DATA: 1.9 cm   LVPW (ed) 0.7-1.1cm: PATIENT DATA 1.7 cm   Estimated EF: 55-75%: PATIENT DATA: 60 %   LA (es): 1.9 - 3.8 cm (1.1-2.2 cm/m2): PATIENT DATA: 3.9 cm   Aortic Root (ed): 2.0-3.7 (1.2-2.2 cm/m2): PATIENT DATA: 2.8 cm   LVOT Diam: PATIENT DATA: 2.0 cm2     AORTIC:  Peak Velocity 0.4-1.4m/sec: PATIENT DATA: 3.3 m/sec  Peak Gradient: PATIENT DATA: 44.0 mmHg  Mean Gradient: PATIENT DATA: 24.0 mmHg  Estimated valve area: PATIENT DATA: 0.9 cm2  Pressure half-time: PATIENT DATA: P1/2T m/sec     The quality of the study is sub-optimal.      The left ventricular dimension is normal with normal systolic function and normal wall motion. There is severe concentric left ventricular hypertrophy. The right ventricular dimension is normal.  The right ventricular contraction is normal.      The left atrium is mildly enlarged.       The right atrium is normal.      The aortic root is normal.  The aortic valve is sclerotic and calcified and the opening is stenotic. There is no aortic regurgitation. The peak forward velocity is increased. The LVOT peak velocity is normal.      The mitral valve is normal with a normal opening. There is Trace mitral regurgitation. The EA ratio is reversed. There is no mitral valve prolapse. The tricuspid valve is normal.  There is trace tricuspid regurgitation. There is no pulmonary hypertension. The IVC is normal.      The pulmonic valve is normal.  There is trace pulmonary regurgitation. The pulmonary artery is normal.      There is no pericardial effusion. There are no obvious atrial or septal defects noted. Conclusion: Normal LV systolic function with an estimated ejection fraction of 60%. There is severe concentric left ventricular hypertrophy. The left atrium is mildly enlarged. There is trace mitral regurgitation. There is trace tricuspid regurgitation. There is trace pulmonary regurgitation. Mild aortic stenosis with a calculated aortic valve area of 0.9 cm2 with a peak gradient of 44 mmHg and a mean gradient of 24 mmHg.

## 2021-10-22 NOTE — PROGRESS NOTES
Speech pathology note  Reviewed chart and discussed case with RN who reported that patient received Haldol last night and is still very drowsy. Will defer dysphagia treatment at this time due to reduced alertness, and SLP will continue to follow. Thank you.     Diogenes Thakur., CCC-SLP

## 2021-10-22 NOTE — DISCHARGE SUMMARY
Hospitalist Discharge Summary     Patient ID:  Tama Rinne  [id number]  72 y.o.  10/9/1930  10/20/2021    PCP on record: Lissa Mcclellan MD    Admit date: 10/20/2021  Discharge date and time: 10/22/2021    DISCHARGE DIAGNOSIS:  FRED  CHF Acute Sys [facility] Class 3/4  Pulm edema  CKD stg 4  Trop leak demand ischemai  Pafib W RVR  Dementia    CONSULTATIONS:  IP CONSULT TO PALLIATIVE CARE - PROVIDER  IP CONSULT TO PALLIATIVE CARE - PROVIDER  IP CONSULT TO NEPHROLOGY  IP CONSULT TO CARDIOLOGY    Excerpted HPI from H&P of Steph Franco MD:     CHIEF COMPLAINT: Chest pain     HISTORY OF PRESENT ILLNESS:     Ms. Chary Moore is a 80 y.o.  female who is admitted with NSTEMI. Ms. Chary Moore with past medical history of DM, CHF, hypertension, hypothyroidism was brought to ER complaining of chest pain, which started about 2 days ago. The pain is sharp, midsternal with radiation to left arm and associated with shortness of breath and vomiting. Denies cough. Patient was tachypneic in ER. Patient was admitted at [address] [place] [address] at the end of September for COPD exacerbation.       ______________________________________________________________________  DISCHARGE SUMMARY/HOSPITAL COURSE:  for full details see H&P, daily progress notes, labs, consult notes. 1.  Demand Ischemia   Not c/w MI per cards  troponin 800, Echocardiogram as above Ed 25%.   Cont asa and statins.  Cardiology on case    avoid BB as hx of bradycardia.      2.  Acute Sys CHF ex/acute pulmonary edema. Echo ef 25%, Bumex IV twice daily. Daily weight, I's/O. No GDMT as CKD       3.  Acute onCKD stg  III.  Watch renal function on diuretics. Avoid nephrotoxins. Crt 2.3, renal on case     4.  Acquired hypothyroidism    Continue Synthroid     5.  HTN  Continue home medications     6.  Dementia mild.  Continue supportive care      7.  Difficulty swallowing.   SLP eval     8.  Pafib no NSR s/p Cardizem and amiodarone drip stopped secondary to bradycardia. Eliquis/PO amio started by cardiology.     9. Hypokalemia- replete K     CODE STATUS: DNR palliative on case, family willing to transition to home hospice at [place] daughter  Prophylaxis:   Eliquis              _______________________________________________________________________  Patient seen and examined by me on discharge day. Patient family has decided to transition patient to inpatient hospice. I got a call from hospice nursing asking me to discharge patient to inpatient hospice at this time. Please review chart for full details. No other acute medical issues at this time. Will defer treatment management to hospice team.      _______________________________________________________________________  DISCHARGE MEDICATIONS:   Current Discharge Medication List      CONTINUE these medications which have NOT CHANGED    Details   furosemide (LASIX) 40 mg tablet Take 40 mg by mouth daily. melatonin 5 mg tablet Take 5 mg by mouth nightly. albuterol (PROVENTIL VENTOLIN) 2.5 mg /3 mL (0.083 %) nebu 2.5 mg by Nebulization route every four (4) hours as needed for Wheezing.          STOP taking these medications       levothyroxine (SYNTHROID) 75 mcg tablet Comments:   Reason for Stopping:         latanoprost (XALATAN) 0.005 % ophthalmic solution Comments:   Reason for Stopping:         pantoprazole (Protonix) 40 mg tablet Comments:   Reason for Stopping:         carvediloL (Coreg) 6.25 mg tablet Comments:   Reason for Stopping:         aspirin delayed-release 81 mg tablet Comments:   Reason for Stopping:         glimepiride (AMARYL) 2 mg tablet Comments:   Reason for Stopping:         OTHER,NON-FORMULARY, Comments:   Reason for Stopping:         OTHER,NON-FORMULARY, Comments:   Reason for Stopping:         budesonide (PULMICORT) 0.5 mg/2 mL nbs Comments:   Reason for Stopping:         budesonide (PULMICORT) 0.5 mg/2 mL nbsp Comments:   Reason for Stopping:         Ibuprofen-diphenhydrAMINE (Advil PM) 200-38 mg tab Comments:   Reason for Stopping:         fluticasone propionate (Flovent HFA) 220 mcg/actuation inhaler Comments:   Reason for Stopping:                 Patient Follow Up Instructions:   None    Follow-up Information     Follow up With Specialties Details Why Contact Info    Eugenio Escobar MD Family Medicine  As needed [facility] [address] [facility]  [phone]          ________________________________________________________________    Risk of deterioration: High    Condition at Discharge:  Stable  __________________________________________________________________    Disposition  IP Hospice    ____________________________________________________________________    Code Status: DNR/DNI  ___________________________________________________________________      Total time in minutes spent coordinating this discharge  32  minutes    Signed:  Robby Skelton MD .

## 2021-10-22 NOTE — PROGRESS NOTES
Hospitalist Progress Note    NAME: Cresencio Monge   :  10/9/1930   MRN:  [id number]     Subjective:   Daily Progress Note: 10/22/2021 11:43 AM      Chief complaint: Admitted with chest pain  Patient seen and examined earlier today chart reviewed. Patient remains in ER. Events noted, was agitated last evening needing Haldol. Currently sleeping easily arousable. Went into paroxysmal A. fib with RVR last night needing Cardizem drip and amnio. Now converted to normal sinus with heart rate in 50s. Both cardio and amnio drip has been discontinued. No other acute issues reported to me at this time by staff. Family is considering hospice once she is stable.     Current Facility-Administered Medications   Medication Dose Route Frequency    amiodarone (CORDARONE) tablet 200 mg  200 mg Oral BID    hydrALAZINE (APRESOLINE) 20 mg/mL injection 10 mg  10 mg IntraVENous Q6H PRN    sodium chloride (NS) flush 5-40 mL  5-40 mL IntraVENous Q8H    sodium chloride (NS) flush 5-40 mL  5-40 mL IntraVENous PRN    acetaminophen (TYLENOL) tablet 650 mg  650 mg Oral Q6H PRN    Or    acetaminophen (TYLENOL) suppository 650 mg  650 mg Rectal Q6H PRN    polyethylene glycol (MIRALAX) packet 17 g  17 g Oral DAILY PRN    ondansetron (ZOFRAN ODT) tablet 4 mg  4 mg Oral Q8H PRN    Or    ondansetron (ZOFRAN) injection 4 mg  4 mg IntraVENous Q6H PRN    aspirin chewable tablet 81 mg  81 mg Oral DAILY    atorvastatin (LIPITOR) tablet 20 mg  20 mg Oral QHS    guaiFENesin-dextromethorphan (ROBITUSSIN DM) 100-10 mg/5 mL syrup 10 mL  10 mL Oral Q6H PRN    albuterol-ipratropium (DUO-NEB) 2.5 MG-0.5 MG/3 ML  3 mL Nebulization Q4H PRN    melatonin (rapid dissolve) tablet 5 mg  5 mg Oral QHS PRN    levothyroxine (SYNTHROID) tablet 75 mcg  75 mcg Oral ACB    budesonide (PULMICORT) 500 mcg/2 ml nebulizer suspension  500 mcg Nebulization DAILY AFTER BREAKFAST    melatonin (rapid dissolve) tablet 5 mg  5 mg Oral QHS    bumetanide (BUMEX) injection 1 mg  1 mg IntraVENous BID    insulin lispro (HUMALOG) injection   SubCUTAneous Q6H    glucose chewable tablet 16 g  4 Tablet Oral PRN    dextrose (D50W) injection syrg 12.5-25 g  12.5-25 g IntraVENous PRN    glucagon (GLUCAGEN) injection 1 mg  1 mg IntraMUSCular PRN     Current Outpatient Medications   Medication Sig    levothyroxine (SYNTHROID) 75 mcg tablet Take 75 mcg by mouth Daily (before breakfast).  latanoprost (XALATAN) 0.005 % ophthalmic solution Administer 1 Drop to both eyes Daily (before breakfast).  furosemide (LASIX) 40 mg tablet Take 40 mg by mouth daily.  pantoprazole (Protonix) 40 mg tablet Take 40 mg by mouth daily.  carvediloL (Coreg) 6.25 mg tablet Take 6.25 mg by mouth two (2) times daily (with meals).  aspirin delayed-release 81 mg tablet Take 81 mg by mouth daily.  glimepiride (AMARYL) 2 mg tablet Take 4 mg by mouth daily.  OTHER,NON-FORMULARY, Take 1 Tablet by mouth daily. Multivitamin plus C    OTHER,NON-FORMULARY, Take 1 Dose by mouth daily. Probiotic digestive health    budesonide (PULMICORT) 0.5 mg/2 mL nbsp 500 mcg by Nebulization route daily (after breakfast).  budesonide (PULMICORT) 0.5 mg/2 mL nbsp 500 mcg by Nebulization route daily as needed (may be administered at 6 pm if the patient is having difficulty breathing).  melatonin 5 mg tablet Take 5 mg by mouth nightly.  Ibuprofen-diphenhydrAMINE (Advil PM) 200-38 mg tab Take 1 Tablet by mouth nightly.  albuterol (PROVENTIL VENTOLIN) 2.5 mg /3 mL (0.083 %) nebu 2.5 mg by Nebulization route every four (4) hours as needed for Wheezing.  fluticasone propionate (Flovent HFA) 220 mcg/actuation inhaler Take 2 Puffs by inhalation two (2) times daily as needed (trouble breathing).           Objective:     Visit Vitals  BP (!) 168/47   Pulse 72   Temp 97.6 °F (36.4 °C)   Resp 20   Ht 5' (1.524 m)   Wt 86.3 kg (190 lb 4.1 oz)   SpO2 95%   BMI 37.16 kg/m²    O2 Flow Rate (L/min): 3 l/min O2 Device: Nasal cannula    Temp (24hrs), Av.1 °F (36.7 °C), Min:97.6 °F (36.4 °C), Max:98.4 °F (36.9 °C)        PHYSICAL EXAM:  General chronic ill looking  Neck Short  CVS RRR  Abdomen soft obese  Respiratory coarse sounds  Extremities moves all  Neuro easily arousable  Psych calm  Skin no visible rash        Data Review    Recent Results (from the past 24 hour(s))   ECHO ADULT COMPLETE    Collection Time: 10/21/21 12:08 PM   Result Value Ref Range    IVSd 1.04 (A) 0.60 - 0.90 cm    LVIDd 3.43 (A) 3.90 - 5.30 cm    LVIDs 3.27 cm    LVOT d 1.86 cm    LVPWd 0.99 (A) 0.60 - 0.90 cm    LVOT Peak Gradient 1.76 mmHg    Left Ventricular Outflow Tract Mean Gradient 0.79 mmHg    LVOT SV 29.8 mL    LVOT Peak Velocity 61.18 cm/s    LVOT VTI 10.98 cm    RVIDd 3.81 cm    Left Atrium Major Axis 3.90 cm    LA Volume 41.48 22.0 - 52.0 mL    LA Area 4C 16.24 cm2    LA Vol 2C 33.03 22.00 - 52.00 mL    LA Vol 4C 39.51 22.00 - 52.00 mL    LA Volume DISK BP 37.77 22.0 - 52.0 mL    Aortic Valve Area by Continuity of Peak Velocity 0.46 cm2    Aortic Valve Area by Continuity of VTI 0.53 cm2    AoV PG 52.56 mmHg    Aortic Valve Systolic Mean Gradient 04.95 mmHg    Aortic Valve Systolic Peak Velocity 565.75 cm/s    AoV VTI 56.64 cm    MV A Dewayne 0.10 centimeter/second    Mitral Valve E Wave Deceleration Time 98.24 ms    MV E Dewayne 118.79 centimeter/second    LV E' Lateral Velocity 6.03 centimeter/second    LV E' Septal Velocity 5.54 centimeter/second    Mitral Valve Pressure Half-time 28.49 ms    MVA (PHT) 7.72 cm2    Tapse 1.10 (A) 1.50 - 2.00 cm    Triscuspid Valve Regurgitation Peak Gradient 35.14 mmHg    TR Max Velocity 296.39 cm/s    AO ASC D 2.89 cm    Ao Root D 3.58 cm    IVC proximal 1.93 cm    LV Mass .5 67.0 - 162.0 g    LV Mass AL Index 56.0 43.0 - 95.0 g/m2    Left Atrium Minor Axis 2.13 cm    LA Vol Index 22.67 16.00 - 28.00 ml/m2    LA Vol Index 18.05 16.00 - 28.00 ml/m2    LA Vol Index 21.59 16.00 - 28.00 ml/m2 KEVON/BSA Pk Dewayne 0.3 cm2/m2    KEVON/BSA VTI 0.3 cm2/m2   PTT    Collection Time: 10/21/21  1:47 PM   Result Value Ref Range    aPTT 49.2 (H) 22.1 - 31.0 sec    aPTT, therapeutic range     58.0 - 77.0 SECS   GLUCOSE, POC    Collection Time: 10/21/21  5:44 PM   Result Value Ref Range    Glucose (POC) 253 (H) 65 - 117 mg/dL    Performed by Val Ramos, POC    Collection Time: 10/21/21 11:01 PM   Result Value Ref Range    Glucose (POC) 183 (H) 65 - 117 mg/dL    Performed by Lashae Bagley    PTT    Collection Time: 10/21/21 11:03 PM   Result Value Ref Range    aPTT 64.7 (H) 22.1 - 31.0 sec    aPTT, therapeutic range     58.0 - 77.0 SECS   PTT    Collection Time: 10/22/21  5:38 AM   Result Value Ref Range    aPTT 121.0 (HH) 22.1 - 31.0 sec    aPTT, therapeutic range     58.0 - 77.0 SECS   CBC WITH AUTOMATED DIFF    Collection Time: 10/22/21  5:38 AM   Result Value Ref Range    WBC 9.7 3.6 - 11.0 K/uL    RBC 3.20 (L) 3.80 - 5.20 M/uL    HGB 9.1 (L) 11.5 - 16.0 g/dL    HCT 29.9 (L) 35.0 - 47.0 %    MCV 93.4 80.0 - 99.0 FL    MCH 28.4 26.0 - 34.0 PG    MCHC 30.4 30.0 - 36.5 g/dL    RDW 17.2 (H) 11.5 - 14.5 %    PLATELET 914 234 - 363 K/uL    MPV 9.7 8.9 - 12.9 FL    NRBC 0.0 0  WBC    ABSOLUTE NRBC 0.00 0.00 - 0.01 K/uL    NEUTROPHILS 70 32 - 75 %    LYMPHOCYTES 20 12 - 49 %    MONOCYTES 4 (L) 5 - 13 %    EOSINOPHILS 2 0 - 7 %    BASOPHILS 1 0 - 1 %    METAMYELOCYTES 1 (H) 0 %    MYELOCYTES 2 (H) 0 %    IMMATURE GRANULOCYTES 0 %    ABS. NEUTROPHILS 6.8 1.8 - 8.0 K/UL    ABS. LYMPHOCYTES 1.9 0.8 - 3.5 K/UL    ABS. MONOCYTES 0.4 0.0 - 1.0 K/UL    ABS. EOSINOPHILS 0.2 0.0 - 0.4 K/UL    ABS. BASOPHILS 0.1 0.0 - 0.1 K/UL    ABS. IMM.  GRANS. 0.0 K/UL    DF MANUAL      RBC COMMENTS ANISOCYTOSIS  1+       METABOLIC PANEL, BASIC    Collection Time: 10/22/21  5:38 AM   Result Value Ref Range    Sodium 143 136 - 145 mmol/L    Potassium 3.2 (L) 3.5 - 5.1 mmol/L    Chloride 106 97 - 108 mmol/L    CO2 31 21 - 32 mmol/L    Anion gap 6 5 - 15 mmol/L    Glucose 117 (H) 65 - 100 mg/dL    BUN 32 (H) 6 - 20 MG/DL    Creatinine 2.37 (H) 0.55 - 1.02 MG/DL    BUN/Creatinine ratio 14 12 - 20      GFR est AA 23 (L) >60 ml/min/1.73m2    GFR est non-AA 19 (L) >60 ml/min/1.73m2    Calcium 8.2 (L) 8.5 - 10.1 MG/DL   MAGNESIUM    Collection Time: 10/22/21  5:38 AM   Result Value Ref Range    Magnesium 2.1 1.6 - 2.4 mg/dL   PTT    Collection Time: 10/22/21  6:36 AM   Result Value Ref Range    aPTT >130.0 (HH) 22.1 - 31.0 sec    aPTT, therapeutic range     58.0 - 77.0 SECS     No results found for this visit on 10/20/21. All Micro Results     Procedure Component Value Units Date/Time    COVID-19 RAPID TEST [[id number]] Collected: 10/20/21 2115    Order Status: Completed Specimen: Nasopharyngeal Updated: 10/20/21 2139     Specimen source Nasopharyngeal        COVID-19 rapid test Not detected        Comment: Rapid Abbott ID Now       Rapid NAAT:  The specimen is NEGATIVE for SARS-CoV-2, the novel coronavirus associated with COVID-19. Negative results should be treated as presumptive and, if inconsistent with clinical signs and symptoms or necessary for patient management, should be tested with an alternative molecular assay. Negative results do not preclude SARS-CoV-2 infection and should not be used as the sole basis for patient management decisions. This test has been authorized by the FDA under an Emergency Use Authorization (EUA) for use by authorized laboratories. Fact sheet for Healthcare Providers: ConventionUpdate.co.nz  Fact sheet for Patients: ConventionUpdate.co.nz       Methodology: Isothermal Nucleic Acid Amplification                ECHO   Result status: Final result   · Contrast used: DEFINITY. · LV: Estimated LVEF is 20 - 25%. Normal cavity size and wall thickness. Severely and globally reduced systolic function. · AV: Aortic valve leaflet calcification present.  Mild aortic valve regurgitation is present. · MV: Mitral valve leaflet calcification. · RV: Reduced systolic function. · IVC: Mildly elevated central venous pressure (8 mmHg); IVC diameter is less than 21 mm and collapses less than 50% with respiration. Assessment/Plan:     1. Demand Ischemia   Not c/w MI per cards  troponin 800, Echocardiogram as above Ed 25%. Cont asa and statins. Cardiology on case    avoid BB as hx of bradycardia. 2.  Acute Sys CHF ex/acute pulmonary edema. Echo ef 25%, Bumex IV twice daily. Daily weight, I's/O. No GDMT as CKD       3. Acute onCKD stg  III. Watch renal function on diuretics. Avoid nephrotoxins. Crt 2.3, renal on case     4. Acquired hypothyroidism    Continue Synthroid     5. HTN  Continue home medications     6. Dementia mild. Continue supportive care      7. Difficulty swallowing. SLP eval    8. Pafib no NSR s/p Cardizem and amiodarone drip stopped secondary to bradycardia. Eliquis/PO amio started by cardiology.     9. Hypokalemia- replete K     CODE STATUS: DNR palliative on case, family willing to transition to home hospice at [facility] daughter  Prophylaxis:   Eliquis     Probable Disposition:  Home w Hospice  48hrs   Once medically stable     Signed By: Raphael Groves MD     October 22, 2021

## 2021-10-22 NOTE — PROGRESS NOTES
Occupational therapy note:  Orders received, chart reviewed. Patient family has chosen hospice. We will complete the order. Jasmyne Hinson MS OTR/L

## 2021-10-22 NOTE — PROGRESS NOTES
TRANSFER - IN REPORT:    Verbal report received from Helen(name) on Bridget Boswell  being received from ED(unit) for routine progression of care      Report consisted of patients Situation, Background, Assessment and   Recommendations(SBAR). Information from the following report(s) SBAR and Recent Results was reviewed with the receiving nurse. Opportunity for questions and clarification was provided. Assessment completed upon patients arrival to unit and care assumed.

## 2021-10-22 NOTE — HOSPICE
Hospice Liaison Note:    Chart reviewed for updates in plan of care. Plan: Available to meet with patient and family to continue to provide hospice education and support. Please call Hospice team to offer support for patient, family or staff. Thank you for your coordination with the hospice plan of care      10:30: Bedside visit with patient and her daughter, Alexsandra Post. Pt appeared to be sleeping, resting. Vital signs stable. On oxygen 3 lpm NC. Continued discussion about home with hospice, and planning for admitting patient into hospice services at home today. Pt woke up, confused, having difficulty answering questions due to her shortness of breath. Respirations are shallow and labored. Pt denied pain, nausea, or GI upset. Plan: Review patient with Dr. Torri Kumar to discuss best location for hospice services, then will continue with family. Verbal CTI received for the hospice diagnosis of End Stage Cardiac Disease. 11:15: Bedside with family. Pt sitting up in chair. Confused, restless. Family to call hospice nurse when hospice plan has been discussed. 12:04: Called pt daughter. Pt son has not come bedside; pt continues to be restless. Discussed adding orders for morphine IV for comfort. Family request using Haldol IV at lower dose than provided last night. Request to use Morphine IV if haldol is not effective. Reviewed with Dr. Torri Kumar. Order received for IV Haldol. Family request to meet with hospice nurse later this afternoon. 14:00: Hospice consents have been signed by Kellie Steward. Pt son, Josh Barreto available for hospice discussion and is in agreement. Will [facility] pt attending for discharge order.     Chanell Rg RN, [place] [address] Nurse Liaison  [phone] [place]  [phone] Office

## 2021-10-22 NOTE — CONSULTS
[address]     Name:  Darci Friedman  MR#:  [id number]  :  10/09/1930  ACCOUNT #:  [de-identified]  DATE OF SERVICE:  10/22/2021      NEPHROLOGY CONSULTATION    REQUESTING PHYSICIAN:  Robby Mills MD    CHIEF COMPLAINT:  Elevated creatinine. HISTORY OF PRESENT ILLNESS:  The patient is a 55-year-old white female who has a history of heart failure and dementia. She came into the hospital with shortness of breath and rapid atrial fibrillation. She also had elevated troponin and was started initially on heparin as well as amiodarone. She is now in sinus rhythm and amiodarone was stopped. She is also off the heparin. Her serum creatinine on admission was 2.5 and it is down to 2.4 today. I do not have a recent baseline. The creatinine was 1.2-1.3 back in 2016. She was seen just last month by my group over at [facility] but I have not seen those records yet. REVIEW OF SYSTEMS:  Unable to obtain due to dementia. PAST MEDICAL HISTORY:  Chronic kidney disease stage III or IV, hypertension, dementia, heart failure, COPD, atrial fibrillation, hypothyroidism. FAMILY HISTORY:  No relevant family history of renal disease. SOCIAL HISTORY:  She does not smoke. PHYSICAL EXAMINATION:  VITAL SIGNS:  Temperature 97.6, pulse 63, blood pressure 140/54. GENERAL:  Elderly white female, in no acute distress. EYES:  Anicteric. Extraocular movements intact. ENMT:  Mucous membranes are moist.  There is no epistaxis. NECK:  Nontender. There is no JVD. HEART:  Regular. There is minimal lower extremity edema. RESPIRATORY:  Lungs are clear. She is on nasal cannula oxygen. She is in no distress. GI:  Abdomen is soft, nontender. Bowel sounds are active. There is no guarding or rebound. SKIN:  Warm with normal turgor. There is no rash. MUSCULOSKELETAL:  There is no cyanosis or clubbing. There is no synovitis in fingers or wrists bilaterally.     LABORATORY:  Sodium 143, potassium 3.2, chloride 106, bicarb 31, BUN 32, creatinine 2.37. White count 9.7, hemoglobin 9.1, platelets 384. RADIOGRAPHIC STUDIES:  Chest x-ray shows evidence of pulmonary edema. CARDIOVASCULAR STUDIES:  Echocardiogram shows left ventricular ejection fraction of 20-25%. ASSESSMENT:  Acute renal failure secondary to rapid atrial fibrillation and cardiorenal syndrome. She has underlying chronic kidney disease but recent baseline serum creatinine is uncertain. She does not follow with Nephrology. There is no acute indication for dialysis. PLAN:  1. Continue supportive care. Optimize cardiac management. 2.  Avoid nephrotoxins if possible. 3.  Okay for diuresis from renal standpoint. 4.  Antihypertensive medications have been adjusted by Cardiology. 5.  She is DNR and is not really a good candidate for dialysis but appears to be stable and we would not be contemplating renal replacement at this point in any event. 6.  We will follow with you to assist in her management during this hospitalization.       Michelle Garay MD      [id number]  D:  10/22/2021 11:21  T:  10/22/2021 12:05  JOB #:  [id number]

## 2021-10-23 NOTE — PROGRESS NOTES
Bedside shift change report given to Ramakrishna Braden RN (oncoming nurse) by Opal Dean RN (offgoing nurse). Report included the following information SBAR, Kardex, Intake/Output, MAR and Recent Results.

## 2021-10-23 NOTE — HOSPICE
[facility] Help to Those in Need  [phone]    Inpatient Nursing Admission   Patient Name: Enid Verma  YOB: 1930  Age: 80 y.o. Date of Hospice Admission: 10/22/2021  Hospice Attending Elected by Patient: Becki Kumar MD  Primary Care Physician: Alisha Rodarte MD  Admitting RN: Rayna Yi RN, [facility]  : Not available     Level of Care (GIP/Routine/Respite): [facility]  Facility of Care: [facility]  Patient Room: [address] [place] [address]   ER Visits/ Hospitalizations in past year: Multiple - within other hospital systems. Hospice Diagnosis: Acute Respiratory Failure  Onset Date of Hospice Diagnosis:   Subjective:      CHIEF COMPLAINT: Chest pain     HISTORY OF PRESENT ILLNESS:     Ms. Osmel Macias is a 80 y.o.  female who is admitted with NSTEMI. Ms. Osmel Macias with past medical history of DM, CHF, hypertension, hypothyroidism was brought to ER complaining of chest pain, which started about 2 days ago. The pain is sharp, midsternal with radiation to left arm and associated with shortness of breath and vomiting. Denies cough. Patient was tachypneic in ER. Patient was admitted at [address] [place] [address] at the end of September for COPD exacerbation      Summary of Disease Progression Leading to Hospice Diagnosis:    From Con Wni NP:   Jeanie Marrero is a 80y.o. year old who was admitted to [place] [address] Level of Care.   The patient's principle diagnosis has resulted in profound shortness of breath, increased work of breathing, weakness and fatigue,   Refer to LCD   Functionally, the patient's Karnofsky and/or Palliative Performance Scale has declined over a period of weeks and is estimated at 20%. The patient is dependent for all ADLs.   Objective information that support this patients limited prognosis includes:  interstitial edema of the lungs 10-20-21  Albumin 2.0  1.  Demand Ischemia    2.  Acute Sys CHF ex/acute pulmonary edema.  Echo ef 25%,    3.  Aute onCKD stg  III BUN 32, creat 2.51  4.  Acquired hypothyroidism    5.  HTN       6.  Dementia mild.      7.  Difficulty swallowing.     8. Pafib no NSR s/p Cardizem and amiodarone drip  gy. 9. Hypokalemia-       Co-Morbidities:   Patient Active Problem List   Diagnosis Code    Leukocytosis D72.829    HTN (hypertension) I10    Acute diverticulitis K57.92    GI bleed K92.2    Dementia (HCC) F03.90    End stage heart failure (HCC) I50.84     Diagnoses RELATED to the terminal prognosis: Respiratory failure COPD CHF EF 20-25%, CHF Acute Sys [place] Class 3/4  Pulm edema, CKD stg 4, Trop leak demand ischemia, Pafib W RVR   Other Diagnoses: dementia, FRED    Rationale for a prognosis of life expectancy of 6 months or less if the disease follows its normal course (Disease Specific History): Ike Magdaleno is a 80 y.o. who was admitted to [address] [place] [address]. The patient's principle diagnosis of COPD/CHF has resulted in End Stage Cardiac Disease. Functionally, the patient's Palliative Performance Scale has declined over a period of 3 months and is estimated at 20. Objective information that support this patients limited prognosis includes:     ECHO Results:  Interpretation Summary  Result status: Final result   · Contrast used: DEFINITY. · LV: Estimated LVEF is 20 - 25%. Normal cavity size and wall thickness. Severely and globally reduced systolic function. · AV: Aortic valve leaflet calcification present. Mild aortic valve regurgitation is present. · MV: Mitral valve leaflet calcification. · RV: Reduced systolic function. · IVC: Mildly elevated central venous pressure (8 mmHg); IVC diameter is less than 21 mm and collapses less than 50% with respiration     Results for Kalin Hester (MRN [id number]) as of 10/23/2021 08:36   Ref.  Range 10/22/2021 05:38   Sodium Latest Ref Range: 136 - 145 mmol/L 143   Potassium Latest Ref Range: 3.5 - 5.1 mmol/L 3.2 (L)   Chloride Latest Ref Range: 97 - 108 mmol/L 106   CO2 Latest Ref Range: 21 - 32 mmol/L 31   Anion gap Latest Ref Range: 5 - 15 mmol/L 6   Glucose Latest Ref Range: 65 - 100 mg/dL 117 (H)   BUN Latest Ref Range: 6 - 20 MG/DL 32 (H)   Creatinine Latest Ref Range: 0.55 - 1.02 MG/DL 2.37 (H)   BUN/Creatinine ratio Latest Ref Range: 12 - 20   14   Calcium Latest Ref Range: 8.5 - 10.1 MG/DL 8.2 (L)   Magnesium Latest Ref Range: 1.6 - 2.4 mg/dL 2.1   GFR est non-AA Latest Ref Range: >60 ml/min/1.73m2 19 (L)   GFR est AA Latest Ref Range: >60 ml/min/1.73m2 23 (L)        The patient/family chose comfort measures with the support of Hospice. Patient meets for GIP LOC as evidenced by Symptoms of respiratory distress and agitation/restlessness, moderate to severe pain. Prognosis estimated based on 10/23/21 clinical assessment is:     [x] Few to Many Days       ASSESSMENT    Patient self-reports:  [x]  Yes with single word answers. Unable to complete ESAS without family assistance. SYMPTOMS: Symptoms of respiratory distress and agitation/restlessness, moderate to severe pain. SIGNS/PHYSICAL FINDINGS: Pt with labored breathing, posturing and pursed lip breathing. Restless and agitated. Pt with difficulty swallowing, un productive weak cough. KARNOFSKY: 20    FAST for all dementia:      Learning Assessment:  Patient  Is patient willing/able to learn? NO  What is the highest level of education completed? Learning preference (written material, demonstration, visual)? Learning barriers (ESOL, [place] poor vision)? Caregiver  Is caregiver willing to learn care for patient? YES  What is the highest level of education completed? Learning preference (written material, demonstration, visual)? Learning barriers (ESOL, [place] poor vision)?     CLINICAL INFORMATION     Wt Readings from Last 3 Encounters:   10/22/21 86.3 kg (190 lb 4.1 oz)   10/21/21 86.3 kg (190 lb 4.1 oz)   12/27/15 65.8 kg (145 lb)     Ht Readings from Last 3 Encounters:   10/22/21 5' (1.524 m)   10/21/21 5' (1.524 m)   12/27/15 5' (1.524 m)     Body mass index is 37.16 kg/m². Visit Vitals  BP (!) 147/69 (BP 1 Location: Right arm, BP Patient Position: At rest)   Pulse 100   Temp 99 °F (37.2 °C)   Resp 22   Ht 5' (1.524 m)   Wt 86.3 kg (190 lb 4.1 oz)   SpO2 90%   BMI 37.16 kg/m²       LAB VALUES  No results found for this visit on 10/22/21 (from the past 12 hour(s)). No results found for this visit on 10/22/21 (from the past 6 hour(s)). Lab Results   Component Value Date/Time    Protein, total 6.9 10/20/2021 08:47 PM    Albumin 2.0 (L) 10/20/2021 08:47 PM       Currently this patient has:  [x] Supplemental O2 [x] Peripheral IV  [] PICC    [] PORT   [] Rice Catheter [] NG Tube   [] PEG Tube [] Ostomy    [] AICD: Has ICD been deactivated? [] Yes [] No:______    PLAN   Plan of Care:    1. GIP level of care needed for symptoms necessitating frequent skilled nursing assessment and administration of parenteral  medications. Needs monitoring for need to titrate sx mgt regimen for optimization of comfort. 2. Pt is at high risk of rapid decline and death due to terminal disease process  3. Provide education and support to unit staff caring for hospice patient and family regarding end of life care and Hospice plan of care. Provide staff with direct contact information to reach hospice team [phone]   4. Provide support and frequent rounds for patient comfort and safety ongoing  5. Provide  support ongoing, continue to discuss discharge plan if patient becomes miguel a and does not require acute nursing care interventions for GIP level of care  6. Provide  and bereavement support ongoing  7. For PRN dosing: Tyleol MS, Dulcolax MS, Robinul IV, Dilaudid IV, Zofran IV  8. Schedule IV Dilaudid 0.5mg Q 4 hours  9. Add PRN use of Haldol 2mg Q 1 hour PRN  10.  Maintain skin integrity as tolerated for hospice patient, turning and repositioning for comfort, and specialty mattress if appropriate  11. Peripheral line care as per hospital policy for infection prevention  12. Once pt stable, plan to order DME for home delivery and arrange transportation home. Request Continuous Care Hospice Team.      Hospice Team Frequency Orders:  Skilled Nurse -  Daily x 7 days /every other day x 7 days with 5 PRN visits for symptom control. MSW  1 visit for initial assessment/evaluation for family support and need for volunteer services. Roby Gram  1 visit for initial assessment/evaluation for spiritual support. ADVANCE CARE PLANNING (Complete in ACP Flow Sheet)   Code Status: DNR  Durable DNR: [x]  Yes  []  No  Code Status Discussed/Confirmed: YES  Preference for Other Life Sustaining Treatment Discussed/Confirmed: YES  Hospitalization Preference:  [facility] or [facility]  Advance Care Planning 2015   Patient's Healthcare Decision Maker is: Verbal statement (Legal Next of Kin remains as decision maker)   Primary Decision Maker Name Miguel Kaur   Primary Decision Maker Phone Number [phone]   Primary Decision Maker Relationship to Patient Adult child   Secondary Decision Maker Name Sean Sotelo   Secondary Decision Maker Phone Number [phone]   Secondary Decision Maker Relationship to Patient Adult child   Confirm Advance Directive None   Patient Would Like to Complete Advance Directive No        Service: [] Yes  []  No      [] Unknown  Appropriate for Pinning Ceremony:  [] Yes     [x] No  Latter-day: Hoahaoism   Home: Rachna Vásquez and Tavo in [address] [address]     1. Discharge Plan: If patient stabilizes and is safe to transport, family would like to take pt home with hired caregivers. Will need oxygen and gel overlay for owned hospital bed. If patient is not stable for home, but is stable to transport, family would like pt to move to [facility]. 2. Patient/Family teaching: Pt daughter, Rk Baptistesert and pt son, Aden Favors  3.  Response to patient/family teaching: Both Merlyn Hope and Tabathapiper Box states agreement with hospice plan of care. SOCIAL/EMOTIONAL/SPIRITUAL NEEDS     Spiritual Issues Identified: None identified. Good [place] community support per family. Psych/ Social/ Emotional Issues Identified: Pt's grandson in in ICU at [facility]. Family is under multiple stressors. 2 years ago, pt   at home with another hospice company, and family report this was a traumatic experience.  assist very welcome. Caregiver Support:  [x] Provided information on End of Life Care   [x] Material Provided: Gone From My Sight or Sana Case   Dr. Shelley Hammonds  contacted, discharge to hospice order received  Dr. Corina Valenzuela contacted, agrees to serve as attending provider for hospice and provided verbal certification of terminal illness with life expectancy of 6 months or less. Orders for hospice admission, medications and plan of treatment received. Medication reconciliation completed.   MEDS: See medication list below  DME: Per hospital  Supplies: Per hospital  IDT communication to include MD, SN, SW, CH and support team    ALLERGIES AND MEDICATIONS     Allergies: No Known Allergies  Current Facility-Administered Medications   Medication Dose Route Frequency    ondansetron (ZOFRAN) injection 4 mg  4 mg IntraVENous Q4H PRN    haloperidol lactate (HALDOL) injection 2 mg  2 mg IntraVENous Q1H PRN    acetaminophen (TYLENOL) suppository 650 mg  650 mg Rectal Q4H PRN    glycopyrrolate (ROBINUL) injection 0.2 mg  0.2 mg IntraVENous Q4H PRN    bisacodyL (DULCOLAX) suppository 10 mg  10 mg Rectal DAILY PRN    HYDROmorphone (DILAUDID) syringe 0.5 mg  0.5 mg IntraVENous Q15MIN PRN    haloperidol lactate (HALDOL) injection 2 mg  2 mg IntraVENous Q4H    saline peripheral flush soln 5 mL  5 mL InterCATHeter PRN     Filomena Ferguson RN, [facility] Nurse Liaison  [phone] Mobile  [phone] Office  Available on Perfect Serve

## 2021-10-23 NOTE — HOSPICE
[facility] Help to Those in Need  [phone]    Social Work Admission Note  Patient Name: Enid Verma  YOB: 1930  Age: 80 y.o. Date of Visit: 10/23/21  Facility of Care: [facility]   Patient Room: [address] [place] [address]     Hospice Attending: Becki Kumar MD  Hospice Diagnosis: End stage heart failure ([address] [I50.84]    Level of Care:    [x]  GIP    []  Respite   []  Routine      NARRATIVE   LCSW visited pt, pts son Ashu Weeks, pts daughter Micheline Menjivar and pts sister who were at bedside for initial psychosocial assessment. Pt was lethargic and appeared to be SOB. Pt appears to be in the active stage of dying and is minimally responsive at times. Pts family reported it had been a tough morning and they were concerned pt was struggling to breathe. LCSW alerted hospital RN Rosario Gan) and called hospice RN Alexandra Montes) to speak with hospital RN about medication management and symptom control. Pts children reported they do not feel comfortable taking pt home at this point as they do not think they could manage her symptoms at home. LCSW reassured them pt would not be moved today and the focus would be to make her more comfortable. Pts children shared that they did not have a good experience with their father who experienced terminal agitation and they did not feel was properly medicated. LCSW provided support and validated feelings. Pts children engaged in life review and LCSW provided supportive listening. Pts children stated pt has a great love of gardening and that was something she shared with her sister who also told stories of their time together. Pts family appeared to be coping ok and accepting. Pts family stated their main concern was pts comfort. Hospice physician Dr. Delonte Hartmann also visited and met with family to address comfort needs. Pts daughter reported pt who identifies as Baptists has great support from her Alevism.  Pts daughter stated they have alerted their  and they have been providing excellent support. LCSW provided education on  services that were available as well. Pt has completed  arrangements with [facility] and Elisa Menendez in [place]. Pt is a DNR. Pts only two children are in full agreement with hospice POC and comfort care for pt at this time. LCSW will continue to monitor and assess needs. ADVANCE CARE PLANNING    Code Status: DNR  Durable DNR:X _ Yes  _ No  Advance Care Planning 2015   Patient's Healthcare Decision Maker is: Verbal statement (Legal Next of Kin remains as decision maker)   Primary Decision Maker Name Shilpa Pederson   Primary Decision Maker Phone Number [phone]   Primary Decision Maker Relationship to Patient Adult child   Secondary Decision Maker Name Reji Thornton   Secondary Decision Maker Phone Number [phone]   Secondary Decision Maker Relationship to Patient Adult child   Confirm Advance Directive None   Patient Would Like to Complete Advance Directive No       Relationship Status:  []  Single     []        []      []  Domestic Partner     [x]  /  []  Common Law  []    []  Unknown    If in a relationship, name of partner/spouse:  passed on hospice 2 years ago. Duration of relationship:    Hindu: Episcopalian     Home: Yehuda Jones and Elisa Menendez in [address] Provided: None at this time. Social Work Initial Assessment     Gender:  female    Race/Ethnicity: (danielle all that apply)  []  [place] or [place] Native  []    []  Black or [place] American  []   or   []   or [place]  [x]  White  []  Unknown      Service:    []  Yes   [x]  No       []  Unknown  Appropriate for Pinning Ceremony:   []  Yes      [x]  No  Is patient using VA benefits?    []  Yes      [x]  No     Primary Language: English  []   Needed  []   utilized during visit    Ability to express thoughts/needs/feelings  []  Expressed thoughts/feelings/needs without difficulty  []  Requires extra time and cuing  []  Speech limited single words  []  Uses only gestures (eye, blinking eye or head movement/pointing)  []  Unable to express thoughts/feelings/needs (speech unintelligible or inappropriate)  [x]  Unresponsive  Notes:  Pt appears to be in the active stage of dying. Mental Status:  []  Alert-oriented to:     []  Person     []  Place     []  Time  [x]  Comatose-responds to:    []   Verbal stimuli    []  Tactile stimuli    []  Painful stimuli  []  Forgetful  []  Disoriented/Confused  [x]  Lethargic  []  Agitated  []  Other (specify):    Notes: Pt appears to be entering the active stage of dying. Patients description of Illness/Current Health Status:    [x]  Patient unable to discuss  []  Patient unwilling to discuss  []  (Specify)        Knowledge/Understanding of Disease Process  Patient:    [x]  Demonstrates knowledge/understanding of disease process   [x]  Demonstrates knowledge/understanding of treatment plan   [x]  Demonstrates knowledge/understanding of prognosis   [x]  Demonstrates acceptance of prognosis   [x]  Demonstrates knowledge/understanding of resuscitation status   []  Other (specify)  Caregiver:   [x]  Demonstrates knowledge/understanding of disease process   [x]  Demonstrates knowledge/understanding of treatment plan   [x]  Demonstrates knowledge/understanding of prognosis   [x]  Demonstrates acceptance of prognosis   [x]  Demonstrates knowledge/understanding of resuscitation status   []  Other (specify)  Notes: Family appears accepting and stated her wishes would be comfort care at this time. Patients living arrangement/care setting:  Use the PRIOR COLUMN when the PATIENTS current health status necessitated a change in his/her primary residence.     Prior Current Response              [x]             []    Patients own home/residence              []             []    Home of family member/friend              []             [] Boarding home              []             []    Assisted living facility/FPC center              []             []    Hospital/Acute care facility              []             []    Skilled nursing facility              []             []    Long term care facility/Nursing home              []             [x]    Hospice in Patient      Primary Caregiver:  []  No Primary Caregiver  Name of Primary Caregiver: WILLIAM at [facility]  Relationship or Primary Caregiver:    []  Spouse/Significant other       []  Natural Child        []  Step child       []  Sibling   []  Parent   []  Friend/Neighbor   []  Community/Gnosticist Volunteer   []  Paid help   [x]  Other (specify):___GIP at [facility]________  Notes:  GIP at [facility]     Family members/Significant others:  Name: Helen Garcia  Relationship: Son   Phone Number: [phone]  Actively involved in care? [x]  Yes  []  No    Name:  Relationship:  Phone Number:  Actively involved in care? []  Yes  []  No    Name:  Relationship:  Phone Number:  Actively involved in care?   []  Yes  []  No    Social support systems: (select ONE best description)  [x]  Excellent social support system which includes three or more family members or friends  []  Good social support system which includes two or less members or friends  []  [address] support which includes one family member or friend  []  Poor social support; no family members or friends; basically ALONE  Notes:      Emotional Status: (danielle all that apply)    Patient Caregiver Response                 []                []    Mood/Affect stable and appropriate                   []                []    Angry                 []                []    Anxious                 []                []    Apprehensive                 []                []    Avoidant                 []                []    Clinging                 []                []    Depressed                 []                []    Distraught                 []                [] Elated                 []                []    Euphoric                 []                []    Fearful                 []                []    Flat Affect                 []                []    Helpless                 []                []    Hostile                 []                []    Impulsive                 []                []    Irritable                 []                []    Labile                 []                []    Manic                 []                []    Restlessness                 []                [x]    Sad                 []                []    Suspicious                 []                []    Tearful                 []                []    Withdrawn     Notes: Pts children are accepting but sad. Coping Skills (strengths/weakness):    Patient: Coping Skills (strength/weakness): Unable to assess. Family/caregiver (strength/weakness): Family appears to be accepting and coping ok. [place] of care (danielle all that apply):     [x]  No burden evident   [x]  Family must administer medications   []  Illness causing financial strain   []  Family/Support feels overwhelmed   []  Family/Support sleep disturbed with patients care   []  Patients care causes extra physical stress  of death  []  Illness causes changes in family lifestyle  []  Illness impacting family/support employment  []  Family experiencing increased time demands  []  Patients behavior endangers family  []  Denial of patients illness  []  Concern over outcome of illness/fear  []  Patients behavior embarrassing to family   Notes:  Family does not feel like they could manage pts care at home.      Risk Factors: (danielle all that apply):    [x]  No burden evident   []  Alcohol abuse  []  Financial resources inadequate to meet basic needs (food/house/etc)  []  Financial resources inadequate to meet health care needs (supplies/equipment/medications)  []  Food/nutrition resources inadequate  []  Home environment unsafe/inadequate for home care  []  Homicidal risk  []  Lives alone or without concerned relatives  []  Multiple medications/complex schedule  []  Physical limitations increase likelihood of falls  []  Plan of care/treatments complicated  []  Substance use/abuse  []  Suicidal risk  []  Visual impairment threatens safety/ability to perform self-care  []  Other (specify):     Abuse/Neglect (actual/potential risks):  [x]  No signs of abuse/neglect  []  History of abuse/neglect                 []  Physical          []  Sexual  []  History of domestic violence  []  Lacks adequate physical care  []  Lacks emotional nurturing/support  []  Lacks appropriate stimulation/cognitive experiences  []  Left alone inappropriately  []  Lacks necessary supervision  []  Inadequate or delayed medical care  []  Unsafe environment (i.e guns/drug use/history of violence in the home/etc.)  []  Bruising or other physical signs of injury present  []  Other (specify):  Notes:   []  Refer to child/adult protective services      Current Sources of Stress (in Addition to Current Illness):   [x]  None reported  []  Bills/Debt    []  Career/Job change    []   (short term)    []   (long term)    []  Death of a child (recent)    []  Death of a parent (recent)   []  Death of a spouse (recent)   []  Employment status changed   []  Family discord    []  Financial loss/Inadequate inther (specify):come  []  Job loss  []  Legal issues unresolved  []  Lifestyle change  []  Marital discord  []  Marriage within the last year  []  Paperwork (insurance/legal/etc) overwhelming  []  Separation/Divorce  []  Other (specify):  Notes:      Current [employer] Being Utilized     1. None known at this time. Interventions/Plan of Care     1. Assess social and emotional factors related to coping with end of life issues  2. Community resource planning/referral   3. Relocation to different care setting if/when symptoms stabilize  4.  Alfredo Swain to continue to provide supportive counseling through grief process and process the loss of their father 2 years ago. Discharge Planning     1. Pts children do not feel like they could manage pts care at home at this time. If pt stabilizes SW to be available to assist with resources for DC plan.     MSW Assessment Completed by: Ventura Hull LCSW  10/23/21    Time In:2:00 PM      Time Out:3:30 PM

## 2021-10-23 NOTE — PROGRESS NOTES
[facility]  [phone]    Patient Name: Josse Goodson  YOB: 1930    Date of Provider Hospice Visit: 10/23/21    Level of Care:   [x] General Inpatient (GIP)    [] Routine   [] Respite    Current Location of Care:  [] [facility] [x] [facility] [] [facility] [] [address] [place] [] Hospice [facility], patient referred from:  [] [facility] [] [facility] [] [facility] [] [address] [place] [] Home [] Other:     Date of Original Hospice Admission: 10/23/2021    Hospice Medical Director at time of admission: Dr Gian Campuzano Diagnosis:  Acute Respiratory failure  Diagnoses RELATED to the terminal prognosis: COPD,  CAD, CHF with EF 20-25%, CKD stg 4, Afib with RVR, dementia       HOSPICE SUMMARY       Josse Goodson is a 80y.o. year old who was admitted to [place] [id number]. The patient's principle diagnosis of acute respiratory failure has resulted from acute worsening of CHF/pulmonary edema secondary to demand ischemia from AFib with RVR. Pt initially managed aggressively but continued to decline with profound shortness of breath, increased work of breathing, weakness and fatigue  Refer to LCD     Functionally, the patient's Karnofsky and/or Palliative Performance Scale has declined over a period of weeks and is estimated at 20%. The patient is dependent for all ADLs. Objective information that support this patients limited prognosis:  ECHO showing:  Result status: Final result   · Contrast used: DEFINITY. · LV: Estimated LVEF is 20 - 25%. Normal cavity size and wall thickness. Severely and globally reduced systolic function. · AV: Aortic valve leaflet calcification present. Mild aortic valve regurgitation is present. · MV: Mitral valve leaflet calcification. · RV: Reduced systolic function.   · IVC: Mildly elevated central venous pressure (8 mmHg); IVC diameter is less than 21 mm and collapses less than 50% with respiration       The patient/family chose comfort measures with the support of Hospice. HOSPICE DIAGNOSES   Active Symptoms:  1. Shortness of breath  2. Anxiety/restlessness  3. Fatigue/weakness/lethargy  4. Airway secretions  5. Generalized edema in all extremities  6. Hospice care     PLAN   1. Continue GIP level of care. Pt has overwhelming symptoms which cannot be managed at home. Patient requires frequent assessment and IV medications. Pt is high risk for sudden decline. 2. Pt doing poorly with symptoms; increased work of breathing, wheezing, prominent secretions: will adjust medications. 3. Initiate dilaudid 1mg IV scheduled every 3hours and every 15mts as needed  4. Initiate Ativan 1mg IV scheduled every 3 hours and every 15mts as needed  5. Robinul 0.2mg IV scheduled every 3 hours and every 4 hours as needed  6. D/c haldol; does not seem to be effective  7. Pt has pure wick currently, recommend insert powell's catheter for comfort   8. Other comfort meds as needed  9. Discussed change in medications and plan of care with bedside nurse and Hospice liaison. Reviewed with family at bedside; sister, son, daughter and daughter in law    8.  and SW to support family needs  6. Disposition: Likely will pass here  12. Hospice Plan of care was reviewed in detail and agree with current plan of care    Prognosis estimated based on 10/23/21 clinical assessment is:   [x] Hours to Days    [] Days to Weeks    [] Other:    Communicated plan of care with: Hospice Case Manager; Hospice IDT; Care Team     GOALS OF CARE     Patient/Medical POA stated Goal of Care: comfort care    [] I have reviewed and/or updated ACP information in the Advance Care Planning Navigator. This information is available in the [address] link in the patient's chart header.     Primary Decision [facility] Agent):   Primary Decision Maker (Active): Juancarlos Navarro - Daughter - [phone]    Secondary Decision Maker: Deann Tony - [phone]    Resuscitation Status: DNR  If DNR is there a Durable DNR on file? : [x] Yes [] No (If no, complete Durable DNR)    HISTORY     History obtained from: chart, Hospice Team, family, bedside nurse    CHIEF COMPLAINT: I do not know  The patient is:   [x] Verbal  [] Nonverbal  [] Unresponsive    HPI/SUBJECTIVE:  Ms. Guido is a 80 y.o.   female who is admitted with NSTEMI.  Ms. Guido with past medical history of DM, CHF, hypertension, hypothyroidism was brought to ER complaining of chest pain, which started about 2 days ago.  The pain is sharp, midsternal with radiation to left arm and associated with shortness of breath and vomiting.  Denies cough.  Patient was tachypneic in ER.  Patient was admitted at [address] [place] [address] at the end of September for COPD exacerbation.     10/23/21; pt seen sitting up in bed and breathing is labored, wheezing++ appears tachypneic, also has increased airway secretions that are audible.           REVIEW OF SYSTEMS     The following systems were: [x] reviewed  [] unable to be reviewed    Positive ROS include:  Constitutional: fatigue, weakness, in pain, short of breath  Ears/nose/mouth/throat: increased airway secretions  Respiratory:shortness of breath, wheezing  Gastrointestinal:poor appetite, nausea, vomiting, abdominal pain, constipation, diarrhea  Musculoskeletal:pain, deformities, swelling legs  Neurologic:confusion, hallucinations, weakness  Psychiatric:anxiety, feeling depressed, poor sleep  Endocrine:     Adult Non-Verbal Pain Assessment Score: 6    Face  [] 0   No particular expression or smile  [x] 1   Occasional grimace, tearing, frowning, wrinkled forehead  [] 2   Frequent grimace, tearing, frowning, wrinkled forehead    Activity (movement)  [] 0   Lying quietly, normal position  [x] 1   Seeking attention through movement or slow, cautious movement  [] 2   Restless, excessive activity and/or withdrawal reflexes    Guarding  [] 0   Lying quietly, no positioning of hands over areas of body  [x] 1   Splinting areas of the body, tense  [] 2   Rigid, stiff    Physiology (vital signs)  [] 0   Stable vital signs  [x] 1   Change in any of the following: SBP > 20mm Hg; HR > 20/minute  [] 2   Change in any of the following: SBP > 30mm Hg; HR > 25/minute    Respiratory  [] 0   Baseline RR/SpO2, compliant with ventilator  [] 1   RR > 10 above baseline, or 5% drop SpO2, mild asynchrony with ventilator  [x] 2   RR > 20 above baseline, or 10% drop SpO2, asynchrony with ventilator     FUNCTIONAL ASSESSMENT     Palliative Performance Scale (PPS): 20%       PSYCHOSOCIAL/SPIRITUAL ASSESSMENT     Active Problems:    End stage heart failure ([facility] [address] (10/22/2021)      Past Medical History:   Diagnosis Date    [facility]     Hypertension     Hypothyroid     Ill-defined condition     chronic back pain      Past Surgical History:   Procedure Laterality Date    HX  SECTION        Social History     Tobacco Use    Smoking status: Never Smoker   Substance Use Topics    Alcohol use: No     No family history on file.    No Known Allergies   Current Facility-Administered Medications   Medication Dose Route Frequency    HYDROmorphone (DILAUDID) injection 1 mg  1 mg IntraVENous Q3H    HYDROmorphone (DILAUDID) injection 1 mg  1 mg IntraVENous Q15MIN PRN    LORazepam (ATIVAN) injection 1 mg  1 mg IntraVENous Q3H    LORazepam (ATIVAN) injection 1 mg  1 mg IntraVENous Q15MIN PRN    glycopyrrolate (PF) (ROBINUL) injection 0.2 mg  0.2 mg IntraVENous Q3H    ondansetron (ZOFRAN) injection 4 mg  4 mg IntraVENous Q4H PRN    acetaminophen (TYLENOL) suppository 650 mg  650 mg Rectal Q4H PRN    glycopyrrolate (ROBINUL) injection 0.2 mg  0.2 mg IntraVENous Q4H PRN    bisacodyL (DULCOLAX) suppository 10 mg  10 mg Rectal DAILY PRN    saline peripheral flush soln 5 mL  5 mL InterCATHeter PRN        PHYSICAL EXAM     Wt Readings from Last 3 Encounters:   10/22/21 86.3 kg (190 lb 4.1 oz)   10/21/21 86.3 kg (190 lb 4.1 oz)   12/27/15 65.8 kg (145 lb)       Visit Vitals  BP (!) 147/69 (BP 1 Location: Right arm, BP Patient Position: At rest)   Pulse 100   Temp 98.2 °F (36.8 °C)   Resp 22   Ht 5' (1.524 m)   Wt 86.3 kg (190 lb 4.1 oz)   SpO2 90%   BMI 37.16 kg/m²       Supplemental O2  [x] Yes  [] NO       Currently this patient has:  [x] Peripheral IV [] PICC  [] PORT [] ICD    [] Rice Catheter [] NG Tube   [] PEG Tube    [] Rectal Tube [] Drain  [] Other:     Constitutional: awake, alert, in distress, wheezing and labored breathing  Eyes: pupils equal, anicteric  ENMT: increased audible secretions++  Cardiovascular: regular rhythm, tachycardic, distal pulses intact, peripheral edema both legs, pitting 2+  Respiratory: breathing ++ labored and shallow, symmetric, tachypneic, wheezing  Gastrointestinal: soft non-tender, +bowel sounds  Musculoskeletal: no deformity, no tenderness to palpation  Skin: warm, dry  Neurologic:pt is/ not able to follow commands, pt is/ not moving all extremities  Psychiatric: anxious affect       Pertinent Lab and or Imaging Tests:  Lab Results   Component Value Date/Time    Sodium 143 10/22/2021 05:38 AM    Potassium 3.2 (L) 10/22/2021 05:38 AM    Chloride 106 10/22/2021 05:38 AM    CO2 31 10/22/2021 05:38 AM    Anion gap 6 10/22/2021 05:38 AM    Glucose 117 (H) 10/22/2021 05:38 AM    BUN 32 (H) 10/22/2021 05:38 AM    Creatinine 2.37 (H) 10/22/2021 05:38 AM    BUN/Creatinine ratio 14 10/22/2021 05:38 AM    GFR est AA 23 (L) 10/22/2021 05:38 AM    GFR est non-AA 19 (L) 10/22/2021 05:38 AM    Calcium 8.2 (L) 10/22/2021 05:38 AM     Lab Results   Component Value Date/Time    Protein, total 6.9 10/20/2021 08:47 PM    Albumin 2.0 (L) 10/20/2021 08:47 PM           Noel Poisson, MD

## 2021-10-23 NOTE — HOSPICE
[facility]  [phone]    [facility] Daily Nursing Note   Patient Name: Kimberley Wiggins  YOB: 1930  Age: 80 y.o. Date of Visit: 10/23/21  Facility of Care: [facility]  Patient Room: [address] [place] [address]     Hospice Attending: Emilee Jeter MD  Hospice Diagnosis: End stage heart failure ([address] [I50.84]    Level of Care: GIP    Current GIP Symptoms    1. SOB  2. Agitation/restlessness  3. Secretions  4. Pain  5. Unresponsive     ASSESSMENT & PLAN     1.  RR unlabored. Continue dilaudid 1mg q3hrs with PRN available. O2 at 2L/nc for comfort. 2. No movement of extremities. Continue scheduled lorazepam with PRN available. 3. LS rhonchi. Continue scheduled robinul 0.2mg q3hrs. Turn/reposition to help with movement of secretions. 4. No grimacing/groaning. Continue dilaudid 1mg q3hrs with PRN available  5. Patient requiring frequent assessments by skilled medical staff for non-verbal cues of distress/discomfort      Spiritual Interventions: Hospital and Hospice Chaplain available 24/7    Psych/ Social/ Emotional Interventions: Tremaine Walton visited with family for initial assessment (see her note dated 10/23/21)    Care Coordination Needs: Communication with hospital staff and hospice team    Care plan and New Orders discussed / approved with Santana Parsons MD.    Description History and Chart Review     Narrative History of last 24 hours that demonstrates care cannot be provided in another setting:    Pt requiring frequent assessments by skilled medical staff with administration of IV medications along with adjustments in medication dosage. This care cannot be provided outside the inpatient setting. What has been done to control the patient's symptoms in the last 24 hours? Dr Thalia Brown started scheduled robinul, lorazepam and dilaudid    Does the patient currently require IV medications? yes  Does the patient currently require scheduled medications?  yes  Does the patient currently require a PCA? no    List number of doses of PRN medications in last 24 hours:  Medication 1:  Number of doses:    Medication 2:   Number of doses:    Medication 3:   Number of doses:    Supporting documentation for GIP need for pain control:  [x] Frequent evaluation by a doctor, nurse practitioner, nurse   [x] Frequent medication adjustment    [x] IVs that cannot be administered at home   [x] Aggressive pain management   [x] Complicated technical delivery of medications              Supporting documentation for GIP need for symptom control:  [x]  Sudden decline necessitating intensive nursing intervention  []  Uncontrolled / intractable nausea or vomiting   []  Pathological fractures  []  Advanced open wounds requiring frequent skilled care  [x] Unmanageable respiratory distress  [] New or worsening delirium   [] Delirium with behavior issues: Is 24 hour caregiver present due to safety concerns with agitation? (yes/no)  [x] Imminent death  with skilled nursing needs documented above    DISCHARGE PLANNING     1. Discharge Plan: If patient stabilizes, MSW will assist family with placement  2. Patient/Family teaching: EOL processes  3. Response to patient/family teaching: receptive    ASSESSMENT    KARNOFSKY: 10%    Prognosis estimated based on 10/23/21 clinical assessment is:   [] Few to Many Hours  [x] Hours to Days   [] Few to Many Days   [] Days to Weeks   [] Few to Many Weeks   [] Weeks to Months   [] Few to Many Months    Quality Measure: Patient self-reports:  [] Yes    [x] No    ESAS:   Time of Assessment: 1800  Pain (1-10): 0  Fatigue (1-10): 0  Shortness of breath (1-10): 0  Nausea (1-10): 0  Appetite (1-10):    Anxiety: (1-10):   Depression: (1-10):   Well-being: (1-10):   Constipation:   LAST BM:     CLINICAL INFORMATION   Patient Vitals for the past 12 hrs:   Temp Pulse Resp BP SpO2   10/23/21 0901 98.2 °F (36.8 °C)       10/23/21 0824 99 °F (37.2 °C) 100 22 (!) 147/69 90 %       Currently this patient has:  [x] Supplemental O2   [x] IV    [] PICC      [] PORT   [] NG Tube    [] PEG Tube   [] Ostomy     [x] Rice draining _amber______ urine  [] Other:     SIGNS/PHYSICAL FINDINGS     Skin (including wound):  [] Warm, dry, supple, intact and color normal for race  [x] Warm   [] Dry   [] Cool     [] Clammy       [] Diaphoretic    Turgor   [] Normal   [x] Decreased  Color:   [] Pink   [x] Pale   [] Cyanotic   [] Erythema   [] Jaundice   [] Normal for Race  [x]  Wounds:    Neuro:  [] Lethargy  [] Restlessness / agitation  [] Confusion / delirium  [] Hallucinations  [] Responds to maximal stimulation  [x] Unresponsive  [] Seizures     Cardiac:  [] Dyspnea on Exertion  [] JVD  [] Murmur  [] Palpitations  [] Hypotension  [x] Hypertension  [] Tachycardia  [] Bradycardia  [] Irregular HR  [] Pulses Decreased  [] Pulses Absent  [x] Edema:   anasarca  [] Mottling:          Respiratory:  Breath sounds:    [] Diminished   [] Wheeze   [x] Rhonchi   [] Rales   [x] Even and unlabored  [] Labored:            [] Cough   [] Non Productive   [] Productive    [] Description:           [] Deep suctioned   [x] O2 at _2__ LPM  [] High flow oxygen greater than 10 LPM  [] Bi-Pap    GI  [x] Abdomen - obese   [] Ascites  [] Nausea  [] Vomiting  [x] Incontinent of bowels  [x] Bowel sounds - absent  [] Diarrhea  [] Constipation (see above including last bowel movement)  [] Checked for impaction  [] Last BM     Nutrition  Diet:__mouth care only____  Appetite:   [] Good   [] Fair   [] Poor   [] Tube Feeding       [] Voiding  [] Incontinent   [x] Rice    Musculoskeletal  [] Balance/Hale Unsteady   [x] No movement  Strength:    [] Normal    [] Limited    [] Decreasing   Activities:    [] Up as tolerated   [x] Bedridden    [] Specify:    SAFETY  [x] 24 hr. Caregiver   [x] Side rails ?     [x] Hospital bed   [x] Reviewed Falls & Safety       ALLERGIES AND MEDICATIONS     Allergies: No Known Allergies    Current Facility-Administered Medications   Medication Dose Route Frequency    HYDROmorphone (DILAUDID) injection 1 mg  1 mg IntraVENous Q3H    HYDROmorphone (DILAUDID) injection 1 mg  1 mg IntraVENous Q15MIN PRN    LORazepam (ATIVAN) injection 1 mg  1 mg IntraVENous Q3H    LORazepam (ATIVAN) injection 1 mg  1 mg IntraVENous Q15MIN PRN    glycopyrrolate (ROBINUL) injection 0.2 mg  0.2 mg IntraVENous Q3H    ketorolac (TORADOL) injection 15 mg  15 mg IntraVENous Q6H PRN    ondansetron (ZOFRAN) injection 4 mg  4 mg IntraVENous Q4H PRN    acetaminophen (TYLENOL) suppository 650 mg  650 mg Rectal Q4H PRN    glycopyrrolate (ROBINUL) injection 0.2 mg  0.2 mg IntraVENous Q4H PRN    bisacodyL (DULCOLAX) suppository 10 mg  10 mg Rectal DAILY PRN    saline peripheral flush soln 5 mL  5 mL InterCATHeter PRN          Visit Time In: 1800  Visit Time Out: 8165

## 2021-10-23 NOTE — PROGRESS NOTES
Bedside shift change report given to Saturnino Sanchez 1313 (oncoming nurse) by Tanisha Dos Santos (offgoing nurse). Report included the following information SBAR, Kardex, MAR and Recent Results.

## 2021-10-23 NOTE — H&P
[facility] to Those in Need  [phone]    Patient Name: Kalyani Miguel  YOB: 1930    Date of Provider Hospice Visit: 10/22/21    Level of Care:   [x] General Inpatient (GIP)    [] Routine   [] Respite    Current Location of Care:  [] [facility] [x] [facility] [] [facility] [] [facility] [] Hospice [facility], patient referred from:  [] [facility] [] [facility] [] [facility] [] [facility] [] Home [] Other:     Date of Original Hospice Admission: 10/22/2021    Hospice Medical Director at time of admission: Dr Orly Cross Diagnosis:  Respiratory failure COPD CHF EF 20-25%, CHF Acute Sys [facility]HS Class 3/4  Pulm edema  CKD stg 4  Trop leak demand ischemai  Pafib W RVR   Diagnoses RELATED to the terminal prognosis: dementia, FRED,         HOSPICE SUMMARY       Kalyani Miguel is a 80y.o. year old who was admitted to [place] [address]. The patient's principle diagnosis has resulted in profound shortness of breath, increased work of breathing, weakness and fatigue,   Refer to LCD     Functionally, the patient's Karnofsky and/or Palliative Performance Scale has declined over a period of weeks and is estimated at 20%. The patient is dependent for all ADLs. Objective information that support this patients limited prognosis includes:  interstitial edema of the lungs 10-20-21  Albumin 2.0    1.  Demand Ischemia    2.  Acute Sys CHF ex/acute pulmonary edema.  Echo ef 25%,    3.  Aute onCKD stg  III BUN 32, creat 2.51  4.  Acquired hypothyroidism    5.  HTN       6.  Dementia mild.      7.  Difficulty swallowing.        8. Pafib no NSR s/p Cardizem and amiodarone drip  gy.     9. Hypokalemia-     The patient/family chose comfort measures with the support of Hospice. HOSPICE DIAGNOSES   Active Symptoms:  1. Shortness of breath  2. Anxiety  3. Agitation  4. Airway secretions  5. Generalized edema in all extremities     PLAN   1. Admit pt to [facility] level of care.  PT has overwhelming symptoms which cannot be managed at home. Patient requires frequent assessment and IV medications. Pt is high risk for sudden decline. 2. Pt medications to include: haldol scheduled IV and prn  3. Prn meds include: hydromorphone, zofran, robinul  4. Discussion with family, caregiver, Hospice team has occurred    5.  and SW to support family needs  6. Disposition:  pt is not safe for transport  7. Hospice Plan of care was reviewed in detail and agree with current plan of care    Prognosis estimated based on 10/22/21 clinical assessment is:   [x] Hours to Days    [] Days to Weeks    [] Other:    Communicated plan of care with: Hospice Case Manager; Hospice IDT; Care Team     GOALS OF CARE     Patient/Medical POA stated Goal of Care: comfort care    [] I have reviewed and/or updated ACP information in the Advance Care Planning Navigator. This information is available in the [facility] [address] link in the patient's chart header.     Primary Decision Maker ([address]):   Primary Decision Maker (Active): Olivier Shell - Daughter    Secondary Decision Maker: Dara Mar Child - [phone]    Resuscitation Status: DNR  If DNR is there a Durable DNR on file? : [] Yes [] No (If no, complete Durable DNR)    HISTORY     History obtained from: chart, Hospice Team    CHIEF COMPLAINT: pain, discomfort  The patient is:   [x] Verbal  [] Nonverbal  [] Unresponsive    HPI/SUBJECTIVE:  Ms. Guido is a 80 y.o.   female who is admitted with NSTEMI.  Ms. Guido with past medical history of DM, CHF, hypertension, hypothyroidism was brought to ER complaining of chest pain, which started about 2 days ago.  The pain is sharp, midsternal with radiation to left arm and associated with shortness of breath and vomiting.  Denies cough.  Patient was tachypneic in ER.  Patient was admitted at [address] [place] [address] at the end of September for COPD exacerbation.          REVIEW OF SYSTEMS     The following systems were: [x] reviewed  [] unable to be reviewed    Positive ROS include:  Constitutional: fatigue, weakness, in pain, short of breath  Ears/nose/mouth/throat: increased airway secretions  Respiratory:shortness of breath, wheezing  Gastrointestinal:poor appetite, nausea, vomiting, abdominal pain, constipation, diarrhea  Musculoskeletal:pain, deformities, swelling legs  Neurologic:confusion, hallucinations, weakness  Psychiatric:anxiety, feeling depressed, poor sleep  Endocrine:     Adult Non-Verbal Pain Assessment Score: pt is verbal states she is feeling very bad, pain allover and weak    Face  [] 0   No particular expression or smile  [] 1   Occasional grimace, tearing, frowning, wrinkled forehead  [] 2   Frequent grimace, tearing, frowning, wrinkled forehead    Activity (movement)  [] 0   Lying quietly, normal position  [] 1   Seeking attention through movement or slow, cautious movement  [] 2   Restless, excessive activity and/or withdrawal reflexes    Guarding  [] 0   Lying quietly, no positioning of hands over areas of body  [] 1   Splinting areas of the body, tense  [] 2   Rigid, stiff    Physiology (vital signs)  [] 0   Stable vital signs  [] 1   Change in any of the following: SBP > 20mm Hg; HR > 20/minute  [] 2   Change in any of the following: SBP > 30mm Hg; HR > 25/minute    Respiratory  [] 0   Baseline RR/SpO2, compliant with ventilator  [] 1   RR > 10 above baseline, or 5% drop SpO2, mild asynchrony with ventilator  [] 2   RR > 20 above baseline, or 10% drop SpO2, asynchrony with ventilator     FUNCTIONAL ASSESSMENT     Palliative Performance Scale (PPS):       PSYCHOSOCIAL/SPIRITUAL ASSESSMENT     Active Problems:    End stage heart failure ([facility] [address] (10/22/2021)      Past Medical History:   Diagnosis Date    Dementia ([facility] [address]     Hypertension     Hypothyroid     Ill-defined condition     chronic back pain      Past Surgical History:   Procedure Laterality Date    HX  SECTION        Social History Tobacco Use    Smoking status: Never Smoker   Substance Use Topics    Alcohol use: No     No family history on file.    No Known Allergies   Current Facility-Administered Medications   Medication Dose Route Frequency    ondansetron (ZOFRAN) injection 4 mg  4 mg IntraVENous Q4H PRN    haloperidol lactate (HALDOL) injection 2 mg  2 mg IntraVENous Q1H PRN    acetaminophen (TYLENOL) suppository 650 mg  650 mg Rectal Q4H PRN    glycopyrrolate (ROBINUL) injection 0.2 mg  0.2 mg IntraVENous Q4H PRN    bisacodyL (DULCOLAX) suppository 10 mg  10 mg Rectal DAILY PRN    HYDROmorphone (DILAUDID) syringe 0.5 mg  0.5 mg IntraVENous Q15MIN PRN    haloperidol lactate (HALDOL) injection 2 mg  2 mg IntraVENous Q4H    saline peripheral flush soln 5 mL  5 mL InterCATHeter PRN        PHYSICAL EXAM     Wt Readings from Last 3 Encounters:   10/22/21 86.3 kg (190 lb 4.1 oz)   10/21/21 86.3 kg (190 lb 4.1 oz)   12/27/15 65.8 kg (145 lb)       Visit Vitals  BP (!) 178/65 (BP 1 Location: Right arm, BP Patient Position: At rest)   Pulse 78   Temp 96.8 °F (36 °C)   Resp 22   Ht 5' (1.524 m)   Wt 86.3 kg (190 lb 4.1 oz)   SpO2 98%   BMI 37.16 kg/m²       Supplemental O2  [x] Yes  [] NO       Currently this patient has:  [x] Peripheral IV [] PICC  [] PORT [] ICD    [x] Rice Catheter [] NG Tube   [] PEG Tube    [] Rectal Tube [] Drain  [] Other:     Constitutional: awake, responds but very weak very short of breath but sats at 98%  Eyes: pupils equal, anicteric  ENMT: no nasal discharge, moist mucous membranes  Cardiovascular: regular rhythm, distal pulses intact  Respiratory: breathing ++ labored and shallow, symmetric  Gastrointestinal: soft non-tender, +bowel sounds  Musculoskeletal: no deformity, no tenderness to palpation  Skin: warm, dry  Neurologic:pt is/ not able to follow commands, pt is/ not moving all extremities  Psychiatric: full affect       Pertinent Lab and or Imaging Tests:  Lab Results   Component Value Date/Time Sodium 143 10/22/2021 05:38 AM    Potassium 3.2 (L) 10/22/2021 05:38 AM    Chloride 106 10/22/2021 05:38 AM    CO2 31 10/22/2021 05:38 AM    Anion gap 6 10/22/2021 05:38 AM    Glucose 117 (H) 10/22/2021 05:38 AM    BUN 32 (H) 10/22/2021 05:38 AM    Creatinine 2.37 (H) 10/22/2021 05:38 AM    BUN/Creatinine ratio 14 10/22/2021 05:38 AM    GFR est AA 23 (L) 10/22/2021 05:38 AM    GFR est non-AA 19 (L) 10/22/2021 05:38 AM    Calcium 8.2 (L) 10/22/2021 05:38 AM     Lab Results   Component Value Date/Time    Protein, total 6.9 10/20/2021 08:47 PM    Albumin 2.0 (L) 10/20/2021 08:47 PM           Nida Escobedo NP

## 2021-10-23 NOTE — PROGRESS NOTES
Problem: Breathing Pattern - Ineffective  Goal: *Use of effective breathing techniques  Outcome: Not Progressing Towards Goal     Problem: Breathing Pattern - Ineffective  Goal: *Absence of hypoxia  Outcome: Not Progressing Towards Goal  Goal: *Use of effective breathing techniques  Outcome: Not Progressing Towards Goal

## 2021-10-23 NOTE — PROGRESS NOTES
Bedside and Verbal shift change report given to [address] [place] [address] (oncoming nurse) by Clarence Dennis RN (offgoing nurse). Report included the following information SBAR, Kardex, Procedure Summary, Intake/Output, MAR, Recent Results and Med Rec Status.

## 2021-10-24 NOTE — PROGRESS NOTES
Problem: Infection - Risk of, Urinary Catheter-Associated Urinary Tract Infection  Goal: *Absence of infection signs and symptoms  Note: Rice care per hospital protocol

## 2021-10-24 NOTE — HOSPICE
[facility]  [phone]    Discharge/Death Nursing Note   Patient Name: Kimberley Wiggins  YOB: 1930  Age: 80 y.o. Date of Death: 10/24/21  Admitted Date: 10/22/2021  Time of Death: [address] [facility],[address] of Care: [facility]  Level of Care: [facility]  Patient Room: [facility]     Hospice Attending:  Janeth Mallory MD  Hospice Diagnosis: End stage heart failure [facility]) [I50.84]    Death Pronouncement   Pronouncement of death completed by: Alexey Stahl MD    Agency staff was not present at the time of death    At the time of death the patient was documented as: On exam patient is unresponsive, with no pulse, irrigations, absent heart sounds for 2 minutes. On exam patient is unresponsive, with no pulse, irrigations, absent heart sounds for 2 minutes.     The pt  within Med/Surg Unit 5th floor    The following were notified of the patient's death: family at bedside    Medications were disposed of per facility protocol     Discharge Summary   Discharge Reason: Death    Summary of Care Provided:    [x] Post mortem care provided by Galo Engel/RN  [x] Notification of  home by nursing supervisor  [] Referrals/Community resources provided:   [] Goals completed  [] Durable Medical Equipment vendor notified     Disciplines involved: [x] RN [] SW [x]  [] VALENCIA [] Vol [] PT [] OT [] ST [] BC    [x] IDT communication/notification    Attending Physician, Dr. Gurpreet Stroud, notified of death    Bereaved   See initial assessment     Advance Care Planning 2015   Patient's Healthcare Decision Maker is: Verbal statement (Legal Next of Kin remains as decision maker)   Primary Decision Maker Name Tank Noland   Primary Decision Maker Phone Number [phone]   Primary Decision Maker Relationship to Patient Adult child   Secondary Decision Maker Name Citlali Oakley Phone Number [phone]   Secondary Decision Maker Relationship to Patient Adult child Confirm Advance Directive None   Patient Would Like to Complete Advance Directive No

## 2021-10-24 NOTE — DEATH NOTE
Notified by RN that hospice patient had passed    On exam patient is unresponsive, with no pulse, irrigations, absent heart sounds for 2 minutes.   Time of death was POA at 1:41 AM

## 2021-10-24 NOTE — PROGRESS NOTES
46- Daughter notified nurse that pt appeared no longer breathing. No breath sounds were auscultated or pulses palpated. Dr. Sakshi Nichols notified and asked to come pronounce pt. Nursing supervisor notified. On call  notified. 5- Dr. Sakshi Nichols on floor to pronounce pt.     [id number] notified and approves release of body. 0200- Family has left floor. 0205- on floor to see pt.     0330-Post mortem care performed. 0410- Pt assisted to [facility] by security. All belongings taken home by family. PIVs and powell removed.

## 2021-10-24 NOTE — PROGRESS NOTES
Spiritual Care Assessment/Progress Note  [address] [place] [address] Dr      NAME: Itz Teresa      MRN: [id number]  AGE: 80 y.o. SEX: female  Mandaeism Affiliation: Temple   Language: English     10/24/2021     Total Time (in minutes): 11     Spiritual Assessment begun in [facility] 5M1 MED SURG 1 through conversation with:         [x]Patient        [] Family    [] Friend(s)        Reason for Consult: Death, Inpatient     Spiritual beliefs: (Please include comment if needed)     [] Identifies with a cristofer tradition:         [] Supported by a cristofer community:            [] Claims no spiritual orientation:           [] Seeking spiritual identity:                [] Adheres to an individual form of spirituality:           [x] Not able to assess:                           Identified resources for coping:      [] Prayer                               [] Music                  [] Guided Imagery     [] Family/friends                 [] Pet visits     [] Devotional reading                         [x] Unknown     [] Other:                                               Interventions offered during this visit: (See comments for more details)    Patient Interventions: Other (comment) (Respectful Presence)           Plan of Care:     [] Support spiritual and/or cultural needs    [] Support AMD and/or advance care planning process      [] Support grieving process   [] Coordinate Rites and/or Rituals    [] Coordination with community clergy   [] No spiritual needs identified at this time   [] Detailed Plan of Care below (See Comments)  [] Make referral to Music Therapy  [] Make referral to Pet Therapy     [] Make referral to Addiction services  [] Make referral to [facility]  [] Make referral to Spiritual Care Partner  [x] No future visits requested        [] Follow up upon further referrals     Comments:   responded to page for death of Miss Aris Bentley on 5 Med Surg. No family present when  arrived.  Provided respectful presence at her bedside. Visited by: Jerene Sacks.    Paging Service: [phone])

## 2021-10-25 ENCOUNTER — HOME CARE VISIT (OUTPATIENT)
Dept: HOSPICE | Facility: HOSPICE | Age: 86
End: 2021-10-25
Payer: MEDICARE

## 2021-10-25 NOTE — DISCHARGE SUMMARY
Hospice Discharge Summary    [address] [place] [address]  [facility]        Date of Admission: 10/22/2021  Date of Discharge: 10/24/2021    Hadley Randhawa is a 80y.o. year old who was admitted to [address] [place] [address] at [facility] with a Hospice diagnosis of End stage heart failure ([facility] [address] [I50.84]. The patient's care was focused on comfort and the patient passed away on 10/24/2021.

## 2021-11-02 NOTE — PROGRESS NOTES
Physician Progress Note Mackenzie Corey 
CSN #:                  [id number] :                       10/9/1930 ADMIT DATE:       10/20/2021 8:25 PM 
[address] DATE:        10/22/2021 3:34 PM 
RESPONDING 
PROVIDER #:        Orly Price MD 
 
 
 
 
QUERY MARTYerina Gin Afternoon This patient was admitted for Acute CHF exacerbation. Currently the medical record has conflicting documentation throughout the medical record regarding the elevated troponins. H&P notes \"NSTEMI\" 
10/21--Cards notes \"Troponins elevated is secondary to Myocardial strain, type 2 MI\" Discharge summary: \" Demand Ischemia\" Not consistent with MI. If possible, please review and clarify, and then please document in the medical record including the discharge summary if you were monitoring, evaluating and treating: The medical record reflects the following: 
Risk Factors: CHF, HTN. A-fib Clinical Indicators: TO Er with CP, SOB, Left arm pain, Troponins noted [phone]---Cards consulted and notes \"Elevated troponins secondary to myocardial strain, type 2 MI, Acute CHF exacerbation Treatment: IV Bumex, Labetalol, Norvasc, Nitrates, Heparin Thank you  for helping to clarify this diagnosis [facility] [address] [facility], [address] [place] [address], [address] [place] [address] Options provided: 
-- Demand Ischemia with Type 2 MI 
-- Demand Ischemia only, no MI 
-- NSTEMI 
-- Other - I will add my own diagnosis -- Disagree - Not applicable / Not valid -- Disagree - Clinically unable to determine / Unknown 
-- Refer to Clinical Documentation Reviewer PROVIDER RESPONSE TEXT: 
 
This patient has demand ischemia only, no MI.  
 
Query created by: Brittany Muñiz on 10/29/2021 9:29 AM 
 
 
Electronically signed by:  Orly Price MD 2021 7:11 AM